# Patient Record
Sex: FEMALE | Race: WHITE
[De-identification: names, ages, dates, MRNs, and addresses within clinical notes are randomized per-mention and may not be internally consistent; named-entity substitution may affect disease eponyms.]

---

## 2018-09-24 ENCOUNTER — HOSPITAL ENCOUNTER (EMERGENCY)
Dept: HOSPITAL 97 - ER | Age: 48
Discharge: HOME | End: 2018-09-24
Payer: COMMERCIAL

## 2018-09-24 VITALS — OXYGEN SATURATION: 97 % | SYSTOLIC BLOOD PRESSURE: 140 MMHG | DIASTOLIC BLOOD PRESSURE: 76 MMHG

## 2018-09-24 VITALS — TEMPERATURE: 98.2 F

## 2018-09-24 DIAGNOSIS — R04.0: Primary | ICD-10-CM

## 2018-09-24 DIAGNOSIS — I10: ICD-10-CM

## 2018-09-24 DIAGNOSIS — F41.8: ICD-10-CM

## 2018-09-24 DIAGNOSIS — Z88.0: ICD-10-CM

## 2018-09-24 PROCEDURE — 99281 EMR DPT VST MAYX REQ PHY/QHP: CPT

## 2018-09-24 NOTE — EDPHYS
Physician Documentation                                                                           

 [facility]                                                                

Name: Isabel Resendiz                                                                               

Age: 48 yrs                                                                                       

Sex: Female                                                                                       

: 1970                                                                                   

MRN: [id number]                                                                                   

Arrival Date: 2018                                                                          

Time: 15:10                                                                                       

Account#: [id number]                                                                            

Bed 18                                                                                            

Private MD:                                                                                       

ED Physician Horace Solis                                                                      

HPI:                                                                                              

                                                                                             

17:07 This 48 yrs old  Female presents to ER via Ambulatory with complaints of Nose  kb  

      Bleed.                                                                                      

17:07 The patient presents with a nose bleed, and the bleeding resolved prior to arrival.     kb  

      Onset: The symptoms/episode began/occurred just prior to arrival. Modifying factors:        

      The symptoms are alleviated by pressure, the symptoms are aggravated by nothing.            

      Associated signs and symptoms: The patient has no apparent associated signs or              

      symptoms, Loss of consciousness: the patient experienced no loss of consciousness.          

      Severity of symptoms: At their worst the symptoms were moderate in the emergency            

      department the symptoms are unchanged. The patient has not experienced similar symptoms     

      in the past. The patient has not recently seen a physician.                                 

                                                                                                  

OB/GYN:                                                                                           

15:25 LMP 2018                                                                            aa5 

                                                                                                  

Historical:                                                                                       

- Allergies:                                                                                      

15:25 PENICILLINS;                                                                            aa5 

- Home Meds:                                                                                      

16:27 Dexilant 60 mg Oral CpDB 1 cap once daily [Active]; Propranolol Oral as needed          hj  

      [Active]; Zoloft 50 mg Oral tab 1 tab once daily [Active]; lisinopril 20 mg oral tab 1      

      tab twice a day [Active];                                                                   

- PMHx:                                                                                           

15:25 Anxiety; Depression; Diverticulitis; Hypertension; palpitations; SVT; Tachycardia;      aa5 

- PSHx:                                                                                           

15:25 ; Tonsillectomy;                                                               aa5 

                                                                                                  

- Immunization history:: Flu vaccine is up to date.                                               

- Social history:: Smoking status: Patient/guardian denies using tobacco.                         

- Ebola Screening: : No symptoms or risks identified at this time.                                

                                                                                                  

                                                                                                  

ROS:                                                                                              

17:06 Constitutional: Negative for fever, chills, and weight loss, Cardiovascular: Negative   kb  

      for chest pain, palpitations, and edema, Respiratory: Negative for shortness of breath,     

      cough, wheezing, and pleuritic chest pain, Abdomen/GI: Negative for abdominal pain,         

      nausea, vomiting, diarrhea, and constipation, MS/Extremity: Negative for injury and         

      deformity, Skin: Negative for injury, rash, and discoloration, Neuro: Negative for          

      headache, weakness, numbness, tingling, and seizure.                                        

17:06 ENT: Positive for nose bleed.                                                               

                                                                                                  

Exam:                                                                                             

17:06 Constitutional:  This is a well developed, well nourished patient who is awake, alert,  kb  

      and in no acute distress. Head/Face:  Normocephalic, atraumatic. Neck:  Trachea             

      midline, no thyromegaly or masses palpated, and no cervical lymphadenopathy.  Supple,       

      full range of motion without nuchal rigidity, or vertebral point tenderness.  No            

      Meningismus. Chest/axilla:  Normal chest wall appearance and motion.  Nontender with no     

      deformity.  No lesions are appreciated. Cardiovascular:  Regular rate and rhythm with a     

      normal S1 and S2.  No gallops, murmurs, or rubs.  Normal PMI, no JVD.  No pulse             

      deficits. Respiratory:  Lungs have equal breath sounds bilaterally, clear to                

      auscultation and percussion.  No rales, rhonchi or wheezes noted.  No increased work of     

      breathing, no retractions or nasal flaring. Abdomen/GI:  Soft, non-tender, with normal      

      bowel sounds.  No distension or tympany.  No guarding or rebound.  No evidence of           

      tenderness throughout. Skin:  Warm, dry with normal turgor.  Normal color with no           

      rashes, no lesions, and no evidence of cellulitis. MS/ Extremity:  Pulses equal, no         

      cyanosis.  Neurovascular intact.  Full, normal range of motion. Neuro:  Awake and           

      alert, GCS 15, oriented to person, place, time, and situation.  Cranial nerves II-XII       

      grossly intact.  Motor strength 5/5 in all extremities.  Sensory grossly intact.            

      Cerebellar exam normal.  Normal gait.                                                       

17:06 ENT: Nose: clotted blood, in both nares.                                                    

                                                                                                  

Vital Signs:                                                                                      

15:25  / 71; Pulse 94; Resp 18 S; Temp 98.2(TE); Pulse Ox 98% on R/A; Weight 106.14 kg  aa5 

      (R); Height 5 ft. 2 in. (157.48 cm) (R); Pain 0/10;                                         

17:06  / 76; Pulse 85; Resp 18; Pulse Ox 97% on R/A;                                    hj  

15:25 Body Mass Index 42.80 (106.14 kg, 157.48 cm)                                            aa5 

                                                                                                  

MDM:                                                                                              

16:40 Patient medically screened.                                                             kb  

17:07 Data reviewed: vital signs, nurses notes. Data interpreted: Pulse oximetry: on room air kb  

      is 97 %. Interpretation: normal. Counseling: I had a detailed discussion with the           

      patient and/or guardian regarding: the historical points, exam findings, and any            

      diagnostic results supporting the discharge/admit diagnosis, the need for outpatient        

      follow up, a family practitioner, to return to the emergency department if symptoms         

      worsen or persist or if there are any questions or concerns that arise at home.             

                                                                                                  

Administered Medications:                                                                         

No medications were administered                                                                  

                                                                                                  

                                                                                                  

Disposition:                                                                                      

                                                                                             

07:54 Co-signature as Attending Physician, Horace Solis MD I agree with the assessment and  neela 

      plan of care.                                                                               

                                                                                                  

Disposition:                                                                                      

18 17:08 Discharged to Home. Impression: Epistaxis.                                         

- Condition is Stable.                                                                            

- Discharge Instructions: Nosebleed, Easy-to-Read.                                                

                                                                                                  

- Medication Reconciliation Form, Thank You Letter, Antibiotic Education, Prescription            

  Opioid Use form.                                                                                

- Follow up: Emergency Department; When: As needed; Reason: Worsening of condition.               

  Follow up: Private Physician; When: 2 - 3 days; Reason: Recheck today's complaints,             

  Continuance of care, Re-evaluation by your physician.                                           

                                                                                                  

                                                                                                  

                                                                                                  

Signatures:                                                                                       

Wendy Lynne, SUNIL-C                 LEVP-Horace Horton MD MD cha Calderon, Audri, RN                     RN   aa5                                                  

Jace Zarate, RN                      RN   hj                                                   

                                                                                                  

Corrections: (The following items were deleted from the chart)                                    

                                                                                             

17:21 17:08 2018 17:08 Discharged to Home. Impression: Epistaxis. Condition is Stable.  hj  

      Forms are Medication Reconciliation Form, Thank You Letter, Antibiotic Education,           

      Prescription Opioid Use. Follow up: Emergency Department; When: As needed; Reason:          

      Worsening of condition. Follow up: Private Physician; When: 2 - 3 days; Reason: Recheck     

      today's complaints, Continuance of care, Re-evaluation by your physician. kb                

                                                                                                  

**************************************************************************************************

## 2018-09-24 NOTE — ER
Nurse's Notes                                                                                     

 [facility]                                                                

Name: Isabel Resendiz                                                                               

Age: 48 yrs                                                                                       

Sex: Female                                                                                       

: 1970                                                                                   

MRN: [id number]                                                                                   

Arrival Date: 2018                                                                          

Time: 15:10                                                                                       

Account#: [id number]                                                                            

Bed 18                                                                                            

Private MD:                                                                                       

Diagnosis: Epistaxis                                                                              

                                                                                                  

Presentation:                                                                                     

                                                                                             

15:23 Presenting complaint: Patient states: nose bleed that began just PTA. Pt states "I've   aa5 

      had the flu for the last week but it's pretty much gone now". No bleeding noted at this     

      time. Denies taking anticoagulants. Transition of care: patient was not received from       

      another setting of care. Onset of symptoms was 2018. Risk Assessment: Do      

      you want to hurt yourself or someone else? Patient reports no desire to harm self or        

      others. Initial Sepsis Screen: Does the patient meet any 2 criteria? No. Patient's          

      initial sepsis screen is negative. Does the patient have a suspected source of              

      infection? No. Patient's initial sepsis screen is negative. Care prior to arrival: None.    

15:23 Method Of Arrival: Ambulatory                                                           aa5 

15:23 Acuity: ARNIE 4                                                                           aa5 

                                                                                                  

Triage Assessment:                                                                                

16:24 General: Appears in no apparent distress. uncomfortable, Behavior is calm, cooperative, hj  

      appropriate for age. Pain: Denies pain.                                                     

                                                                                                  

OB/GYN:                                                                                           

15:25 LMP 2018                                                                            aa5 

                                                                                                  

Historical:                                                                                       

- Allergies:                                                                                      

15:25 PENICILLINS;                                                                            aa5 

- Home Meds:                                                                                      

16:27 Dexilant 60 mg Oral CpDB 1 cap once daily [Active]; Propranolol Oral as needed          hj  

      [Active]; Zoloft 50 mg Oral tab 1 tab once daily [Active]; lisinopril 20 mg oral tab 1      

      tab twice a day [Active];                                                                   

- PMHx:                                                                                           

15:25 Anxiety; Depression; Diverticulitis; Hypertension; palpitations; SVT; Tachycardia;      aa5 

- PSHx:                                                                                           

15:25 ; Tonsillectomy;                                                               aa5 

                                                                                                  

- Immunization history:: Flu vaccine is up to date.                                               

- Social history:: Smoking status: Patient/guardian denies using tobacco.                         

- Ebola Screening: : No symptoms or risks identified at this time.                                

                                                                                                  

                                                                                                  

Screenin:24 Abuse screen: Denies threats or abuse. Denies injuries from another. Nutritional        hj  

      screening: No deficits noted. Tuberculosis screening: No symptoms or risk factors           

      identified. Fall Risk None identified.                                                      

                                                                                                  

Assessment:                                                                                       

15:25 General: Appears in no apparent distress. uncomfortable, Behavior is calm, cooperative, hj  

      appropriate for age. Pain: Denies pain. Neuro: Level of Consciousness is awake, alert,      

      obeys commands, Oriented to person, place, time, situation, Appropriate for age.            

      Cardiovascular: Capillary refill < 3 seconds Patient's skin is warm and dry.                

      Respiratory: Reports nose bleed that stopped after 10 mins of pressure; Airway is           

      patent Respiratory effort is even, unlabored, Respiratory pattern is regular,               

      symmetrical, Parent/caregiver reports the patient having. GI:. : No signs and/or          

      symptoms were reported regarding the genitourinary system. EENT: No signs and/or            

      symptoms were reported regarding the EENT system. Derm: No signs and/or symptoms            

      reported regarding the dermatologic system. Musculoskeletal: No signs and/or symptoms       

      reported regarding the musculoskeletal system.                                              

16:30 Reassessment: Patient and/or family updated on plan of care and expected duration. Pain hj  

      level reassessed. Patient is alert, oriented x 3, equal unlabored respirations, skin        

      warm/dry/pink. took propanol; will re check BP;.                                            

17:09 Reassessment: Patient and/or family updated on plan of care and expected duration. Pain hj  

      level reassessed. Patient is alert, oriented x 3, equal unlabored respirations, skin        

      warm/dry/pink. BP re checked, no reports of bleeding;.                                      

                                                                                                  

Vital Signs:                                                                                      

15:25  / 71; Pulse 94; Resp 18 S; Temp 98.2(TE); Pulse Ox 98% on R/A; Weight 106.14 kg  aa5 

      (R); Height 5 ft. 2 in. (157.48 cm) (R); Pain 0/10;                                         

17:06  / 76; Pulse 85; Resp 18; Pulse Ox 97% on R/A;                                    hj  

15:25 Body Mass Index 42.80 (106.14 kg, 157.48 cm)                                            aa5 

                                                                                                  

ED Course:                                                                                        

15:10 Patient arrived in ED.                                                                  mr  

15:25 Triage completed.                                                                       aa5 

15:25 Arm band placed on.                                                                     aa5 

16:24 Jace Zarate, RN is Primary Nurse.                                                    hj  

16:24 Patient has correct armband on for positive identification. Bed in low position. Call   hj  

      light in reach. Side rails up X 1.                                                          

16:39 [facility]                                                        kb  

16:39 Horace Solis MD is Attending Physician.                                             kb  

17:20 No provider procedures requiring assistance completed. Patient did not have IV access   hj  

      during this emergency room visit.                                                           

                                                                                                  

Administered Medications:                                                                         

No medications were administered                                                                  

                                                                                                  

                                                                                                  

Outcome:                                                                                          

17:08 Discharge ordered by MD.                                                                kb  

17:21 Discharged to home ambulatory.                                                          hj  

17:21 Condition: stable                                                                           

17:21 Discharge instructions given to patient, Instructed on discharge instructions, follow       

      up and referral plans. Demonstrated understanding of instructions, follow-up care.          

17:21 Patient left the ED.                                                                    hj  

                                                                                                  

Signatures:                                                                                       

Wendy Lynne FNP-C FNP-Kim Durbin                                                                                   

Noni Bartlett, RN                     RN   aa5                                                  

Jace Zarate, KAREN                      RN                                                      

                                                                                                  

**************************************************************************************************

## 2018-12-07 ENCOUNTER — HOSPITAL ENCOUNTER (EMERGENCY)
Dept: HOSPITAL 97 - ER | Age: 48
Discharge: HOME | End: 2018-12-07
Payer: COMMERCIAL

## 2018-12-07 VITALS — DIASTOLIC BLOOD PRESSURE: 96 MMHG | TEMPERATURE: 97.6 F | OXYGEN SATURATION: 98 % | SYSTOLIC BLOOD PRESSURE: 137 MMHG

## 2018-12-07 DIAGNOSIS — F41.9: ICD-10-CM

## 2018-12-07 DIAGNOSIS — Z79.899: ICD-10-CM

## 2018-12-07 DIAGNOSIS — J06.9: Primary | ICD-10-CM

## 2018-12-07 DIAGNOSIS — F32.9: ICD-10-CM

## 2018-12-07 DIAGNOSIS — I10: ICD-10-CM

## 2018-12-07 PROCEDURE — 87070 CULTURE OTHR SPECIMN AEROBIC: CPT

## 2018-12-07 PROCEDURE — 87081 CULTURE SCREEN ONLY: CPT

## 2018-12-07 PROCEDURE — 87804 INFLUENZA ASSAY W/OPTIC: CPT

## 2018-12-07 PROCEDURE — 99283 EMERGENCY DEPT VISIT LOW MDM: CPT

## 2018-12-07 NOTE — ER
Nurse's Notes                                                                                     

 [facility]                                                                

Name: Isabel Resendiz                                                                               

Age: 48 yrs                                                                                       

Sex: Female                                                                                       

: 1970                                                                                   

MRN: [id number]                                                                                   

Arrival Date: 2018                                                                          

Time: 19:15                                                                                       

Account#: [id number]                                                                            

Bed 26                                                                                            

Private MD:                                                                                       

Diagnosis: Acute upper respiratory infection, unspecified                                         

                                                                                                  

Presentation:                                                                                     

                                                                                             

19:19 Presenting complaint: Patient states: "My grand-daughter has strep throat so I wanted   hb  

      to get checked out. My throat is red and itchy" Reports that her throat has been sore       

      for the past 3 days. Denies fever. Transition of care: patient was not received from        

      another setting of care. Resp Distress? No respiratory distress is noted at this time.      

      Onset of symptoms was 2018. Risk Assessment: Do you want to hurt yourself      

      or someone else? Patient reports no desire to harm self or others. Initial Sepsis           

      Screen: Does the patient meet any 2 criteria? No. Patient's initial sepsis screen is        

      negative. Does the patient have a suspected source of infection? No. Patient's initial      

      sepsis screen is negative. Care prior to arrival: None.                                     

19:19 Method Of Arrival: Ambulatory                                                             

19:19 Acuity: ARNIE 4                                                                           hb  

                                                                                                  

Triage Assessment:                                                                                

19:22 General: Appears in no apparent distress. comfortable, Behavior is calm, cooperative,   hb  

      appropriate for age. Pain: Complains of pain in left aspect of posterior pharynx and        

      right aspect of posterior pharynx Pain currently is 3 out of 10 on a pain scale. Neuro:     

      Level of Consciousness is awake, alert, obeys commands. Cardiovascular: Patient's skin      

      is warm and dry. Respiratory: Airway is patent Respiratory effort is even, unlabored,       

      Respiratory pattern is regular, symmetrical.                                                

19:31 EENT: Throat is clear is pink Reports nasal congestion nasal discharge. Respiratory:    ak1 

      Breath sounds are clear. GI: No signs and/or symptoms were reported involving the           

      gastrointestinal system. : No signs and/or symptoms were reported regarding the           

      genitourinary system. Derm: No signs and/or symptoms reported regarding the                 

      dermatologic system. Musculoskeletal: No signs and/or symptoms reported regarding the       

      musculoskeletal system.                                                                     

                                                                                                  

OB/GYN:                                                                                           

19:22 LMP 2018                                                                           hb  

                                                                                                  

Historical:                                                                                       

- Allergies:                                                                                      

19:22 PENICILLINS;                                                                            hb  

- Home Meds:                                                                                      

19:22 Dexilant 60 mg Oral CpDB 1 cap once daily [Active]; lisinopril 20 mg Oral tab 1 tab     hb  

      twice a day [Active]; lisinopril-hydrochlorothiazide 20-12.5 mg Oral tab 1 tab once         

      daily [Active]; Propranolol Oral as needed [Active]; Zoloft 50 mg Oral tab 1 tab once       

      daily [Active]; ProAir HFA inhalation inhalation [Active]; Zyrtec Oral [Active];            

- PMHx:                                                                                           

19:22 Anxiety; Depression; Diverticulitis; Hypertension; palpitations; SVT; Tachycardia;      hb  

                                                                                                  

- Immunization history:: Flu vaccine is not up to date.                                           

- Social history:: Smoking status: Patient/guardian denies using tobacco.                         

- Ebola Screening: : Patient denies travel to an Ebola-affected area in the 21 days               

  before illness onset.                                                                           

                                                                                                  

                                                                                                  

Screenin:30 Abuse screen: Denies threats or abuse. Denies injuries from another. Nutritional        ak1 

      screening: No deficits noted. Tuberculosis screening: No symptoms or risk factors           

      identified. Fall Risk None identified.                                                      

                                                                                                  

Assessment:                                                                                       

19:31 Reassessment: Patient appears in no apparent distress at this time. No changes from     ak1 

      previously documented assessment. see triage assessment.                                    

                                                                                                  

Vital Signs:                                                                                      

19:22  / 96; Pulse 87; Resp 18; Temp 97.6; Pulse Ox 98% on R/A; Weight 111.13 kg (R);   hb  

      Height 5 ft. 2 in. (157.48 cm) (R); Pain 3/10;                                              

19:22 Body Mass Index 44.81 (111.13 kg, 157.48 cm)                                            hb  

                                                                                                  

ED Course:                                                                                        

19:15 Patient arrived in ED.                                                                  mr  

19:21 Triage completed.                                                                       hb  

19:22 Arm band placed on Patient placed in an exam room.                                      hb  

19:26 Candelaria Dunne, RN is Primary Nurse.                                                     ak1 

19:31 Patient has correct armband on for positive identification. Bed in low position. Call   ak1 

      light in reach. Side rails up X 1.                                                          

19:34 Horace Cohen PA is PHCP.                                                                cp  

19:36 Brandan White MD is Attending Physician.                                             cp  

20:22 No provider procedures requiring assistance completed.                                  ak1 

21:00 Patient did not have IV access during this emergency room visit.                        ak1 

                                                                                                  

Administered Medications:                                                                         

20:01 Drug: Tessalon Perle 200 mg Route: PO;                                                  ak1 

20:22 Follow up: Response: No adverse reaction                                                ak1 

                                                                                                  

                                                                                                  

Outcome:                                                                                          

20:54 Condition: good                                                                         ak1 

20:56 Discharge ordered by MD.                                                                cp  

20:58 Discharged to home ambulatory.                                                          ak1 

20:58 Discharge instructions given to patient, Instructed on discharge instructions, follow       

      up and referral plans. no drinking with medication, no driving heavy equipment,             

      medication usage, Demonstrated understanding of instructions, follow-up care,               

      medications, Prescriptions given X 2.                                                       

21:01 Patient left the ED.                                                                    ak1 

                                                                                                  

Signatures:                                                                                       

Lalita Diaz                                 mr                                                   

Candelaria Dunne RN                       RN   ak1                                                  

Horace Cohen PA PA cp Baxter, Heather, KAREN                     RN                                                      

                                                                                                  

**************************************************************************************************

## 2018-12-07 NOTE — EDPHYS
Physician Documentation                                                                           

 [facility]                                                                

Name: Isabel Resendiz                                                                               

Age: 48 yrs                                                                                       

Sex: Female                                                                                       

: 1970                                                                                   

MRN: [id number]                                                                                   

Arrival Date: 2018                                                                          

Time: 19:15                                                                                       

Account#: [id number]                                                                            

Bed 26                                                                                            

Private MD:                                                                                       

ED Physician Brandan White                                                                      

HPI:                                                                                              

                                                                                             

19:45 This 48 yrs old  Female presents to ER via Ambulatory with complaints of       cp  

      Congestion, Sore Throat, Cough.                                                             

                                                                                                  

OB/GYN:                                                                                           

19:22 LMP 2018                                                                           hb  

                                                                                                  

Historical:                                                                                       

- Allergies:                                                                                      

19:22 PENICILLINS;                                                                            hb  

- Home Meds:                                                                                      

19:22 Dexilant 60 mg Oral CpDB 1 cap once daily [Active]; lisinopril 20 mg Oral tab 1 tab     hb  

      twice a day [Active]; lisinopril-hydrochlorothiazide 20-12.5 mg Oral tab 1 tab once         

      daily [Active]; Propranolol Oral as needed [Active]; Zoloft 50 mg Oral tab 1 tab once       

      daily [Active]; ProAir HFA inhalation inhalation [Active]; Zyrtec Oral [Active];            

- PMHx:                                                                                           

19:22 Anxiety; Depression; Diverticulitis; Hypertension; palpitations; SVT; Tachycardia;      hb  

                                                                                                  

- Immunization history:: Flu vaccine is not up to date.                                           

- Social history:: Smoking status: Patient/guardian denies using tobacco.                         

- Ebola Screening: : Patient denies travel to an Ebola-affected area in the 21 days               

  before illness onset.                                                                           

                                                                                                  

                                                                                                  

ROS:                                                                                              

20:00 Constitutional: Negative for body aches, chills, fever, poor PO intake.                 cp  

20:00 Eyes: Negative for injury, pain, redness, and discharge.                                cp  

20:00 ENT: Positive for sore throat, Negative for drainage from ear(s), ear pain, difficulty      

      swallowing, difficulty handling secretions.                                                 

20:00 Cardiovascular: Negative for chest pain.                                                    

20:00 Respiratory: Positive for cough, Negative for shortness of breath, wheezing.                

20:00 Abdomen/GI: Negative for abdominal pain, nausea, vomiting, and diarrhea.                    

20:00 Skin: Negative for cellulitis, rash.                                                        

20:00 Neuro: Negative for altered mental status, headache.                                        

20:00 All other systems are negative.                                                             

                                                                                                  

Exam:                                                                                             

20:05 Constitutional: The patient appears in no acute distress, alert, awake, non-toxic, well cp  

      developed, well nourished.                                                                  

20:05 Head/Face:  Normocephalic, atraumatic.                                                  cp  

20:05 Eyes: Periorbital structures: appear normal, Conjunctiva: normal, no exudate, no            

      injection, Lids and lashes: appear normal, bilaterally.                                     

20:05 ENT: External ear(s): are unremarkable, Ear canal(s): are normal, clear, TM's: bulging,     

      is not appreciated, bilaterally, dullness, bilaterally, erythema, is not appreciated,       

      bilaterally, Nose: is normal, Mouth: Lips: moist, Oral mucosa: pink and intact, moist,      

      Posterior pharynx: Airway: no evidence of obstruction, patent, Tonsils: no enlargement,     

      no exudate, swelling, is not appreciated, erythema, that is mild, exudate, is not           

      appreciated.                                                                                

20:05 Neck: ROM/movement: is normal, is supple, without pain, no range of motions                 

      limitations, no nuchal rigidity, Lymph nodes: lymphadenopathy is appreciated, anterior      

      cervical nodes.                                                                             

20:05 Chest/axilla: Inspection: normal, Palpation: is normal, no crepitus, no tenderness.         

20:05 Cardiovascular: Rate: normal, Rhythm: regular, Edema: is not appreciated, JVD: is not       

      appreciated.                                                                                

20:05 Respiratory: the patient does not display signs of respiratory distress,  Respirations:     

      normal, no use of accessory muscles, no retractions, no splinting, no tachypnea, Breath     

      sounds: are clear throughout, no decreased breath sounds, no stridor, no wheezing.          

20:05 Abdomen/GI: Exam negative for discomfort, distension, guarding, Inspection: abdomen         

      appears normal.                                                                             

20:05 Back: pain, is absent, ROM is normal.                                                       

20:05 Skin: cellulitis, is not appreciated, no rash present.                                      

                                                                                                  

Vital Signs:                                                                                      

19:22  / 96; Pulse 87; Resp 18; Temp 97.6; Pulse Ox 98% on R/A; Weight 111.13 kg (R);   hb  

      Height 5 ft. 2 in. (157.48 cm) (R); Pain 3/10;                                              

19:22 Body Mass Index 44.81 (111.13 kg, 157.48 cm)                                            hb  

                                                                                                  

MDM:                                                                                              

19:36 Patient medically screened.                                                             cp  

20:00 Differential diagnosis: URI, pneumonia meningitis, strep throat, influenza.             cp  

20:55 Data reviewed: vital signs, nurses notes, lab test result(s), and as a result, I will   cp  

      discharge patient.                                                                          

20:55 Counseling: I had a detailed discussion with the patient and/or guardian regarding: the cp  

      historical points, exam findings, and any diagnostic results supporting the                 

      discharge/admit diagnosis, lab results, to return to the emergency department if            

      symptoms worsen or persist or if there are any questions or concerns that arise at          

      home. Special discussion: I discussed with the patient/guardian that the patient's          

      current presentation does not indicate dosing of antibiotics. They should follow-up         

      with their primary care provider and return if the symptoms persist or progress.            

                                                                                                  

                                                                                             

19:27 Order name: Strep; Complete Time: 20:52                                                 ak1 

                                                                                             

20:52 Interpretation: Reviewed.                                                               cp  

                                                                                             

19:50 Order name: Influenza Screen (a \T\ B); Complete Time: 20:52                              cp

                                                                                             

20:52 Interpretation: Reviewed.                                                                 

                                                                                             

19:56 Order name: Throat Culture                                                              EDMS

                                                                                                  

Administered Medications:                                                                         

20:01 Drug: Tessalon Perle 200 mg Route: PO;                                                  ak1 

20:22 Follow up: Response: No adverse reaction                                                ak1 

                                                                                                  

                                                                                                  

Disposition:                                                                                      

18 20:56 Discharged to Home. Impression: Acute upper respiratory infection, unspecified.    

- Condition is Stable.                                                                            

- Discharge Instructions: Upper Respiratory Infection, Adult.                                     

- Prescriptions for Tessalon Perles 100 mg Oral Capsule - take 2 capsule by ORAL route            

  every 8 hours As needed; 30 capsule. Albuterol Sulfate 90 mcg/actuation - inhale 1-2            

  puff by INHALATION route every 4-6 hours; 1 Inhaler.                                            

- Medication Reconciliation Form, Thank You Letter, Antibiotic Education, Prescription            

  Opioid Use form.                                                                                

- Follow up: Private Physician; When: 2 - 3 days; Reason: symptoms continue.                      

- Problem is new.                                                                                 

- Symptoms have improved.                                                                         

                                                                                                  

                                                                                                  

                                                                                                  

Addendum:                                                                                         

12/10/2018                                                                                        

     20:56 Co-signature as Attending Physician, Brandan White MD Available for consultation at    p
s1

           all times.                                                                             

                                                                                                  

Signatures:                                                                                       

Dispatcher [employer]                           EDMS                                                 

Candelaria Dunne RN                       RN   ak1                                                  

Horace Cohen PA PA cp Baxter, Heather, RN RN hb Singer, Phillip, MD MD   ps1                                                  

                                                                                                  

Corrections: (The following items were deleted from the chart)                                    

                                                                                             

21:01 20:56 2018 20:56 Discharged to Home. Impression: Acute upper respiratory          ak1 

      infection, unspecified. Condition is Stable. Forms are Medication Reconciliation Form,      

      Thank You Letter, Antibiotic Education, Prescription Opioid Use. Follow up: Private         

      Physician; When: 2 - 3 days; Reason: symptoms continue. Problem is new. Symptoms have       

      improved. cp                                                                                

                                                                                                  

**************************************************************************************************

## 2019-02-13 ENCOUNTER — HOSPITAL ENCOUNTER (EMERGENCY)
Dept: HOSPITAL 97 - ER | Age: 49
Discharge: HOME | End: 2019-02-13
Payer: COMMERCIAL

## 2019-02-13 VITALS — TEMPERATURE: 98.2 F

## 2019-02-13 VITALS — DIASTOLIC BLOOD PRESSURE: 72 MMHG | SYSTOLIC BLOOD PRESSURE: 127 MMHG

## 2019-02-13 VITALS — OXYGEN SATURATION: 99 %

## 2019-02-13 DIAGNOSIS — E86.0: ICD-10-CM

## 2019-02-13 DIAGNOSIS — I10: Primary | ICD-10-CM

## 2019-02-13 DIAGNOSIS — R06.4: ICD-10-CM

## 2019-02-13 DIAGNOSIS — F41.9: ICD-10-CM

## 2019-02-13 DIAGNOSIS — Z79.899: ICD-10-CM

## 2019-02-13 DIAGNOSIS — F32.9: ICD-10-CM

## 2019-02-13 LAB
BUN BLD-MCNC: 14 MG/DL (ref 7–18)
GLUCOSE SERPLBLD-MCNC: 94 MG/DL (ref 74–106)
HCT VFR BLD CALC: 41.2 % (ref 36–45)
LYMPHOCYTES # SPEC AUTO: 4.1 K/UL (ref 0.7–4.9)
PMV BLD: 8.9 FL (ref 7.6–11.3)
POTASSIUM SERPL-SCNC: 4.5 MMOL/L (ref 3.5–5.1)
RBC # BLD: 4.73 M/UL (ref 3.86–4.86)
TROPONIN (EMERG DEPT USE ONLY): < 0.02 NG/ML (ref 0–0.04)

## 2019-02-13 PROCEDURE — 80048 BASIC METABOLIC PNL TOTAL CA: CPT

## 2019-02-13 PROCEDURE — 85025 COMPLETE CBC W/AUTO DIFF WBC: CPT

## 2019-02-13 PROCEDURE — 36415 COLL VENOUS BLD VENIPUNCTURE: CPT

## 2019-02-13 PROCEDURE — 99284 EMERGENCY DEPT VISIT MOD MDM: CPT

## 2019-02-13 PROCEDURE — 84484 ASSAY OF TROPONIN QUANT: CPT

## 2019-02-13 PROCEDURE — 93005 ELECTROCARDIOGRAM TRACING: CPT

## 2019-02-13 NOTE — EKG
Test Date:    2019-02-13               Test Time:    15:42:35

Technician:   TONY                                     

                                                     

MEASUREMENT RESULTS:                                       

Intervals:                                           

Rate:         66                                     

GA:           182                                    

QRSD:         88                                     

QT:           400                                    

QTc:          419                                    

Axis:                                                

P:            29                                     

GA:           182                                    

QRS:          12                                     

T:            25                                     

                                                     

INTERPRETIVE STATEMENTS:                                       

                                                     

Normal sinus rhythm

Minimal voltage criteria for LVH, may be normal variant

Borderline ECG

Compared to ECG 01/18/2017 23:23:45

No significant changes



Electronically Signed On 02-13-19 16:43:06 CST by Ramírez Morris

## 2019-02-13 NOTE — ER
Nurse's Notes                                                                                     

 [facility]                                                                

Name: Isabel Resendiz                                                                               

Age: 48 yrs                                                                                       

Sex: Female                                                                                       

: 1970                                                                                   

MRN: [id number]                                                                                   

Arrival Date: 2019                                                                          

Time: 14:06                                                                                       

Account#: [id number]                                                                            

Bed 8                                                                                             

Private MD: Unknown, Unknown                                                                      

Diagnosis: Hypertension, anxiety, hyperventilation, dehydration                                   

                                                                                                  

Presentation:                                                                                     

                                                                                             

14:08 Presenting complaint: Anxiety x 1 hr, Home /91, HR 93. Denies headache.           hb  

      Propanolol 10mg administered PTA. Transition of care: patient was not received from         

      another setting of care. Onset of symptoms was 2019. Risk Assessment: Do       

      you want to hurt yourself or someone else? Patient reports no desire to harm self or        

      others. Care prior to arrival: None.                                                        

14:08 Method Of Arrival: Ambulatory                                                           hb  

14:08 Acuity: ARNIE 3                                                                           hb  

15:45 Initial Sepsis Screen: Does the patient meet any 2 criteria? No. Patient's initial      sv  

      sepsis screen is negative. Does the patient have a suspected source of infection? No.       

      Patient's initial sepsis screen is negative.                                                

                                                                                                  

OB/GYN:                                                                                           

14:09 LMP 2019                                                                           hb  

                                                                                                  

Historical:                                                                                       

- Allergies:                                                                                      

14:10 PENICILLINS;                                                                            hb  

- Home Meds:                                                                                      

14:10 Dexilant 60 mg Oral CpDB 1 cap once daily [Active]; lisinopril 20 mg Oral tab 1 tab     hb  

      twice a day [Active]; lisinopril-hydrochlorothiazide 20-12.5 mg Oral tab 1 tab once         

      daily [Active]; ProAir HFA inhalation [Active]; Propranolol Oral as needed [Active];        

      Zoloft 50 mg Oral tab 1 tab once daily [Active]; Zyrtec Oral [Active];                      

- PMHx:                                                                                           

14:10 Anxiety; Depression; Diverticulitis; Hypertension; palpitations; SVT; Tachycardia;      hb  

                                                                                                  

- Immunization history:: Adult Immunizations up to date.                                          

- Social history:: Smoking status: Patient/guardian denies using tobacco.                         

- Ebola Screening: : No symptoms or risks identified at this time.                                

- Family history:: not pertinent.                                                                 

- Hospitalizations: : No recent hospitalization is reported.                                      

                                                                                                  

                                                                                                  

Screening:                                                                                        

15:45 Abuse screen: Denies threats or abuse. Denies injuries from another. Nutritional        sv  

      screening: No deficits noted. Tuberculosis screening: No symptoms or risk factors           

      identified. Fall Risk None identified.                                                      

                                                                                                  

Assessment:                                                                                       

15:45 General: Appears in no apparent distress. comfortable, obese, well developed, Behavior  sv  

      is calm, cooperative, appropriate for age. General: Reports "maybe feeling anxious at       

      times.". Pain: Denies pain. Neuro: Level of Consciousness is awake, alert, obeys            

      commands, Oriented to person, place, time, situation, Moves all extremities. Full           

      function Gait is steady. Respiratory: Respiratory effort is even, unlabored,                

      Respiratory pattern is regular, symmetrical. Derm: Skin is pink, warm \T\ dry.              

17:00 Reassessment: Patient appears in no apparent distress at this time. No changes from     sv  

      previously documented assessment. Patient and/or family updated on plan of care and         

      expected duration. Pain level reassessed. Patient is alert, oriented x 3, equal             

      unlabored respirations, skin warm/dry/pink.                                                 

19:29 Reassessment: Patient appears in no apparent distress at this time. No changes from     sv  

      previously documented assessment. Patient and/or family updated on plan of care and         

      expected duration. Pain level reassessed. Patient is alert, oriented x 3, equal             

      unlabored respirations, skin warm/dry/pink.                                                 

                                                                                                  

Vital Signs:                                                                                      

14:09  / 92; Pulse 82; Resp 18; Temp 98.2; Pulse Ox 100% on R/A; Pain 0/10;             hb  

16:05  / 72; Pulse 64; Resp 18; Pulse Ox 100% ;                                         sv  

16:30  / 79; Pulse 68; Resp 18; Pulse Ox 99% ;                                          sv  

17:00  / 63; Pulse 65; Resp 18; Pulse Ox 100% ;                                         sv  

17:30  / 56; Pulse 68; Resp 18; Pulse Ox 100% ;                                         sv  

18:00  / 74; Pulse 70; Resp 18; Pulse Ox 99% ;                                          sv  

19:15  / 72; Pulse 71; Resp 18; Pulse Ox 99% ;                                          sv  

                                                                                                  

ED Course:                                                                                        

14:06 Patient arrived in ED.                                                                  sb2 

14:07 Unknown, Unknown is Private Physician.                                                  sb2 

14:09 Triage completed.                                                                       hb  

14:09 Arm band placed on.                                                                     hb  

15:10 Germain Neumann MD is Attending Physician.                                                rn  

15:32 Belkys Bowers RN is Primary Nurse.                                                  sv  

15:45 Patient has correct armband on for positive identification. Placed in gown. Bed in low  sv  

      position. Call light in reach. Pulse ox on. NIBP on. Door closed. Head of bed elevated.     

15:45 Missed attempt(s): 22 gauge in left forearm. Bleeding controlled, band aid applied,     sv  

      catheter tip intact.                                                                        

15:46 EKG done, by EKG tech. reviewed by Germain Neumann MD.                                      dt2 

15:48 Initial lab(s) drawn, by me, sent to lab. Inserted saline lock: 22 gauge in left        sv  

      antecubital area, using aseptic technique. Blood collected. Flushed left antecubital        

      with 5 ml normal saline.                                                                    

19:29 No provider procedures requiring assistance completed. IV discontinued, intact,         sv  

      bleeding controlled, No redness/swelling at site. Pressure dressing applied.                

                                                                                                  

Administered Medications:                                                                         

No medications were administered                                                                  

                                                                                                  

                                                                                                  

Outcome:                                                                                          

18:20 Discharge ordered by MD.                                                                rn  

19:29 Discharged to home ambulatory.                                                          sv  

19:29 Condition: stable                                                                           

19:29 Discharge instructions given to patient, Instructed on discharge instructions, follow       

      up and referral plans. Demonstrated understanding of instructions, follow-up care.          

19:29 Patient left the ED.                                                                    sv  

                                                                                                  

Signatures:                                                                                       

Belkys Bowers, RN                    Germain Quintanilla MD MD rn Baxter, Heather, Jackie Wheatley RN                               sb2                                                  

Kellen Mtz                             dt2                                                  

                                                                                                  

**************************************************************************************************

## 2019-02-13 NOTE — EDPHYS
Physician Documentation                                                                           

 [facility]                                                                

Name: Isabel Resendiz                                                                               

Age: 48 yrs                                                                                       

Sex: Female                                                                                       

: 1970                                                                                   

MRN: [id number]                                                                                   

Arrival Date: 2019                                                                          

Time: 14:06                                                                                       

Account#: [id number]                                                                            

Bed 8                                                                                             

Private MD: Unknown, Unknown                                                                      

ED Physician Germain Neumann                                                                         

HPI:                                                                                              

                                                                                             

16:18 This 48 yrs old  Female presents to ER via Ambulatory with complaints of High  rn  

      Blood Pressure.                                                                             

16:18 The patient has elevated blood pressure and discovered this at home. Onset: The         rn  

      symptoms/episode began/occurred at an unknown time. Modifying factors:. Severity of         

      symptoms: At its worst the blood pressure was mild, in the emergency department the         

      blood pressure is improved. The patient has experienced similar episodes in the past.       

      Reports several intermittent episodes of feeling "spacy" for a few days, noticed BP was     

      high, also has frequent anxiety, took propranolol and felt better, no chest                 

      pain/sob/focal neuro deficits. .                                                            

                                                                                                  

OB/GYN:                                                                                           

14:09 LMP 2019                                                                           hb  

                                                                                                  

Historical:                                                                                       

- Allergies:                                                                                      

14:10 PENICILLINS;                                                                            hb  

- Home Meds:                                                                                      

14:10 Dexilant 60 mg Oral CpDB 1 cap once daily [Active]; lisinopril 20 mg Oral tab 1 tab     hb  

      twice a day [Active]; lisinopril-hydrochlorothiazide 20-12.5 mg Oral tab 1 tab once         

      daily [Active]; ProAir HFA inhalation [Active]; Propranolol Oral as needed [Active];        

      Zoloft 50 mg Oral tab 1 tab once daily [Active]; Zyrtec Oral [Active];                      

- PMHx:                                                                                           

14:10 Anxiety; Depression; Diverticulitis; Hypertension; palpitations; SVT; Tachycardia;      hb  

                                                                                                  

- Immunization history:: Adult Immunizations up to date.                                          

- Social history:: Smoking status: Patient/guardian denies using tobacco.                         

- Ebola Screening: : No symptoms or risks identified at this time.                                

- Family history:: not pertinent.                                                                 

- Hospitalizations: : No recent hospitalization is reported.                                      

                                                                                                  

                                                                                                  

ROS:                                                                                              

16:18 Constitutional: Negative for fever, chills, and weight loss, Eyes: Negative for injury, rn  

      pain, redness, and discharge, Neck: Negative for injury, pain, and swelling,                

      Cardiovascular: Negative for chest pain, and edema, Respiratory: Negative for shortness     

      of breath, cough, wheezing, and pleuritic chest pain, Abdomen/GI: Negative for              

      abdominal pain, nausea, vomiting, diarrhea, and constipation, MS/Extremity: Negative        

      for injury and deformity, Skin: Negative for injury, rash, and discoloration, Neuro:        

      Negative for headache, weakness, numbness, tingling, and seizure.                           

                                                                                                  

Exam:                                                                                             

16:18 Constitutional:  This is a well developed, well nourished patient who is awake, alert,  rn  

      and in no acute distress. Head/Face:  Normocephalic, atraumatic. Eyes:  Pupils equal        

      round and reactive to light, extra-ocular motions intact.  Lids and lashes normal.          

      Conjunctiva and sclera are non-icteric and not injected.  Cornea within normal limits.      

      Periorbital areas with no swelling, redness, or edema. ENT:  MMM Cardiovascular:            

      Regular rate and rhythm, No pulse deficits. Respiratory:  Lungs have equal breath           

      sounds bilaterally, clear to auscultation.  No increased work of breathing, no              

      retractions or nasal flaring. Abdomen/GI:  soft, non-tender Skin:  Warm, dry MS/            

      Extremity:  Pulses equal, no cyanosis.  Neurovascular intact.  Full, normal range of        

      motion.  Equal circumference. Neuro:  Awake and alert, GCS 15, oriented to person,          

      place, time, and situation.  Cranial nerves II-XII grossly intact.  Motor strength 5/5      

      in all extremities.  Sensory grossly intact.                                                

                                                                                                  

Vital Signs:                                                                                      

14:09  / 92; Pulse 82; Resp 18; Temp 98.2; Pulse Ox 100% on R/A; Pain 0/10;             hb  

16:05  / 72; Pulse 64; Resp 18; Pulse Ox 100% ;                                         sv  

16:30  / 79; Pulse 68; Resp 18; Pulse Ox 99% ;                                          sv  

17:00  / 63; Pulse 65; Resp 18; Pulse Ox 100% ;                                         sv  

17:30  / 56; Pulse 68; Resp 18; Pulse Ox 100% ;                                         sv  

18:00  / 74; Pulse 70; Resp 18; Pulse Ox 99% ;                                          sv  

19:15  / 72; Pulse 71; Resp 18; Pulse Ox 99% ;                                          sv  

                                                                                                  

MDM:                                                                                              

15:10 Patient medically screened.                                                             rn  

18:18 Differential diagnosis: hypertensive crisis, Malignant HTN. Differential diagnosis:     rn  

      anxiety. Data reviewed: vital signs, nurses notes. Counseling: I had a detailed             

      discussion with the patient and/or guardian regarding: the historical points, exam          

      findings, and any diagnostic results supporting the discharge/admit diagnosis, lab          

      results, the need for outpatient follow up, to return to the emergency department if        

      symptoms worsen or persist or if there are any questions or concerns that arise at          

      home. Response to treatment: the patient's condition has returned to base line, the         

      patient is now symptom free, and as a result, I will discharge patient. Special             

      discussion: I discussed with the patient/guardian in detail that at this point there is     

      no indication for admission to the hospital. It is understood, however, that if the         

      symptoms persist or worsen the patient needs to return immediately for re-evaluation.       

18:18 Counseling: I had a detailed discussion with the patient and/or guardian regarding: the rn  

      presence of at least one elevated blood pressure reading (>120/80) during this              

      emergency department visit.                                                                 

18:18 ED course: Will take BP journal and take to PCP for further BP management, normal neuro rn  

      exam and w/u. .                                                                             

                                                                                                  

                                                                                             

15:31 Order name: CBC with Diff                                                               rn  

                                                                                             

15:31 Order name: Basic Metabolic Panel; Complete Time: 19:03                                 rn  

                                                                                             

15:31 Order name: IV Start; Complete Time: 16:04                                              rn  

                                                                                             

15:31 Order name: Troponin (emerg Dept Use Only); Complete Time: 19:03                        rn  

                                                                                             

15:31 Order name: EKG; Complete Time: 15:32                                                   rn  

                                                                                             

15:31 Order name: EKG - Nurse/Tech; Complete Time: 16:04                                      rn  

                                                                                                  

Administered Medications:                                                                         

No medications were administered                                                                  

                                                                                                  

                                                                                                  

Disposition:                                                                                      

19 18:20 Discharged to Home. Impression: Hypertension, anxiety, hyperventilation,           

  dehydration.                                                                                    

- Condition is Stable.                                                                            

- Discharge Instructions: Hypertension, Generalized Anxiety Disorder.                             

                                                                                                  

- Medication Reconciliation Form, Thank You Letter, Antibiotic Education, Prescription            

  Opioid Use form.                                                                                

- Follow up: Private Physician; When: As needed; Reason: Recheck today's complaints,              

  Re-evaluation by your physician.                                                                

- Problem is new.                                                                                 

- Symptoms have improved.                                                                         

                                                                                                  

                                                                                                  

                                                                                                  

Signatures:                                                                                       

Dispatcher [employer]                           Belkys Mancini RN RN sv Nieto, Roman, MD MD rn Baxter, Heather, RN RN                                                      

                                                                                                  

Corrections: (The following items were deleted from the chart)                                    

19:29 18:20 2019 18:20 Discharged to Home. Impression: Hypertension, anxiety,           sv  

      hyperventilation, dehydration. Condition is Stable. Forms are Medication Reconciliation     

      Form, Thank You Letter, Antibiotic Education, Prescription Opioid Use. Follow up:           

      Private Physician; When: As needed; Reason: Recheck today's complaints, Re-evaluation       

      by your physician. Problem is new. Symptoms have improved. rn                               

                                                                                                  

**************************************************************************************************

## 2019-07-05 ENCOUNTER — HOSPITAL ENCOUNTER (EMERGENCY)
Dept: HOSPITAL 97 - ER | Age: 49
Discharge: HOME | End: 2019-07-05
Payer: COMMERCIAL

## 2019-07-05 VITALS — SYSTOLIC BLOOD PRESSURE: 128 MMHG | DIASTOLIC BLOOD PRESSURE: 78 MMHG | OXYGEN SATURATION: 100 % | TEMPERATURE: 98 F

## 2019-07-05 DIAGNOSIS — F32.9: ICD-10-CM

## 2019-07-05 DIAGNOSIS — J06.0: Primary | ICD-10-CM

## 2019-07-05 DIAGNOSIS — F41.9: ICD-10-CM

## 2019-07-05 DIAGNOSIS — I10: ICD-10-CM

## 2019-07-05 DIAGNOSIS — Z88.0: ICD-10-CM

## 2019-07-05 PROCEDURE — 99282 EMERGENCY DEPT VISIT SF MDM: CPT

## 2019-07-05 PROCEDURE — 87081 CULTURE SCREEN ONLY: CPT

## 2019-07-05 PROCEDURE — 87070 CULTURE OTHR SPECIMN AEROBIC: CPT

## 2019-07-05 NOTE — ER
Nurse's Notes                                                                                     

 [facility]                                                                 

Name: Isabel Resendiz                                                                               

Age: 48 yrs                                                                                       

Sex: Female                                                                                       

: 1970                                                                                   

MRN: [id number]                                                                                   

Arrival Date: 2019                                                                          

Time: 17:31                                                                                       

Account#: [id number]                                                                            

Bed 12                                                                                            

Private MD:                                                                                       

Diagnosis: Acute laryngopharyngitis                                                               

                                                                                                  

Presentation:                                                                                     

                                                                                             

17:54 Presenting complaint: Nonproductive cough and hoarse voice x 2-3 days. Denies           hb  

      pain/fever. Transition of care: patient was not received from another setting of care.      

      Onset of symptoms was 2019. Risk Assessment: Do you want to hurt yourself or       

      someone else? Patient reports no desire to harm self or others. Care prior to arrival:      

      None.                                                                                       

17:54 Method Of Arrival: Ambulatory                                                           hb  

17:54 Acuity: ARNIE 4                                                                           hb  

19:07 Initial Sepsis Screen: Does the patient meet any 2 criteria? No. Patient's initial      iw  

      sepsis screen is negative. Does the patient have a suspected source of infection? No.       

      Patient's initial sepsis screen is negative.                                                

                                                                                                  

OB/GYN:                                                                                           

17:55 LMP 2019                                                                            hb  

                                                                                                  

Historical:                                                                                       

- Allergies:                                                                                      

17:57 PENICILLINS;                                                                            hb  

- Home Meds:                                                                                      

17:57 Dexilant 60 mg Oral CpDB 1 cap once daily [Active]; lisinopril 20 mg Oral tab 1 tab     hb  

      twice a day [Active]; lisinopril-hydrochlorothiazide 20-12.5 mg Oral tab 1 tab once         

      daily [Active]; ProAir HFA inhalation [Active]; Propranolol Oral as needed [Active];        

      Zoloft 50 mg Oral tab 1 tab once daily [Active]; Zyrtec Oral [Active];                      

- PMHx:                                                                                           

17:57 Anxiety; Depression; Diverticulitis; Hypertension; palpitations; SVT; Tachycardia;      hb  

                                                                                                  

- Immunization history:: Adult Immunizations up to date.                                          

- Social history:: Smoking status: Patient/guardian denies using tobacco.                         

- Ebola Screening: : No symptoms or risks identified at this time.                                

                                                                                                  

                                                                                                  

Screenin:06 Abuse screen: Denies threats or abuse. Denies injuries from another. Nutritional        iw  

      screening: No deficits noted. Tuberculosis screening: No symptoms or risk factors           

      identified. Fall Risk None identified.                                                      

                                                                                                  

Assessment:                                                                                       

19:05 General: Appears in no apparent distress. Behavior is calm, cooperative. Pain:          iw  

      Complains of pain in throat. Neuro: Level of Consciousness is awake, alert, obeys           

      commands, Moves all extremities. Cardiovascular: Patient's skin is warm and dry.            

      Respiratory: Airway is patent Respiratory effort is even, unlabored, Breath sounds are      

      clear bilaterally. GI: No signs and/or symptoms were reported involving the                 

      gastrointestinal system. EENT: Throat is reddened bilaterally Reports difficulty            

      swallowing. Derm: Skin is intact, is healthy with good turgor. Musculoskeletal: Range       

      of motion: intact in all extremities.                                                       

                                                                                                  

Vital Signs:                                                                                      

17:55  / 78; Pulse 78; Resp 16; Temp 98; Pulse Ox 100% on R/A; Weight 111.13 kg; Height hb  

      5 ft. 2 in. (157.48 cm); Pain 2/10;                                                         

17:55 Body Mass Index 44.81 (111.13 kg, 157.48 cm)                                            hb  

                                                                                                  

ED Course:                                                                                        

17:31 Patient arrived in ED.                                                                  mr  

17:55 Triage completed.                                                                       hb  

17:57 Arm band placed on.                                                                     hb  

17:58 Mechelle Osborn, RN is Primary Nurse.                                                   hb  

17:58 Horace Cohen PA is PHCP.                                                                cp  

17:58 Horace Solis MD is Attending Physician.                                             cp  

18:30 Patient has correct armband on for positive identification.                             iw  

18:44 Belkys Benson MD is Referral Physician.                                           cp  

19:06 No provider procedures requiring assistance completed. Patient did not have IV access   iw  

      during this emergency room visit.                                                           

                                                                                                  

Administered Medications:                                                                         

No medications were administered                                                                  

                                                                                                  

                                                                                                  

Outcome:                                                                                          

18:51 Discharge ordered by MD.                                                                cp  

19:06 Discharged to home ambulatory.                                                          iw  

19:06 Condition: good                                                                             

19:06 Discharge instructions given to patient, Instructed on discharge instructions, follow       

      up and referral plans. medication usage, Demonstrated understanding of instructions,        

      follow-up care, medications, Prescriptions given X 1.                                       

19:07 Patient left the ED.                                                                    iw  

                                                                                                  

Signatures:                                                                                       

Lalita Diaz Irene RN                     KAREN                                                      

Horace Cohen PA PA cp Baxter, Heather, RN RN                                                      

                                                                                                  

**************************************************************************************************

## 2019-07-05 NOTE — EDPHYS
Physician Documentation                                                                           

 [facility]                                                                 

Name: Isabel Resendiz                                                                               

Age: 48 yrs                                                                                       

Sex: Female                                                                                       

: 1970                                                                                   

MRN: [id number]                                                                                   

Arrival Date: 2019                                                                          

Time: 17:31                                                                                       

Account#: [id number]                                                                            

Bed 12                                                                                            

Private MD:                                                                                       

ED Physician Horace Solis                                                                      

HPI:                                                                                              

                                                                                             

18:07 This 48 yrs old  Female presents to ER via Ambulatory with complaints of Sore  cp  

      Throat, Lost voice.                                                                         

18:07 The patient presents with sore throat. The patient describes throat pain as scratchy.   cp  

      Onset: The symptoms/episode began/occurred 2-3 weeks ago. Associated signs and              

      symptoms: Pertinent positives: loss of voice, intermittent cough, Pertinent negatives       

      dysphagia, fever.                                                                           

18:07 The patient has been recently seen at an urgent care, a couple of weeks ago, for        cp  

      similar complaints, given steroid injection and Flonase NS.                                 

                                                                                                  

OB/GYN:                                                                                           

17:55 LMP 2019                                                                            hb  

                                                                                                  

Historical:                                                                                       

- Allergies:                                                                                      

17:57 PENICILLINS;                                                                            hb  

- Home Meds:                                                                                      

17:57 Dexilant 60 mg Oral CpDB 1 cap once daily [Active]; lisinopril 20 mg Oral tab 1 tab     hb  

      twice a day [Active]; lisinopril-hydrochlorothiazide 20-12.5 mg Oral tab 1 tab once         

      daily [Active]; ProAir HFA inhalation [Active]; Propranolol Oral as needed [Active];        

      Zoloft 50 mg Oral tab 1 tab once daily [Active]; Zyrtec Oral [Active];                      

- PMHx:                                                                                           

17:57 Anxiety; Depression; Diverticulitis; Hypertension; palpitations; SVT; Tachycardia;      hb  

                                                                                                  

- Immunization history:: Adult Immunizations up to date.                                          

- Social history:: Smoking status: Patient/guardian denies using tobacco.                         

- Ebola Screening: : No symptoms or risks identified at this time.                                

                                                                                                  

                                                                                                  

ROS:                                                                                              

18:15 Constitutional: Negative for body aches, chills, fever, poor PO intake.                 cp  

18:15 Eyes: Negative for injury, pain, redness, and discharge.                                cp  

18:15 ENT: Positive for sore throat, Negative for drainage from ear(s), ear pain, difficulty      

      swallowing, difficulty handling secretions.                                                 

18:15 Neck: Negative for pain with movement, pain at rest, stiffness.                             

18:15 Respiratory: Negative for cough, wheezing.                                                  

18:15 Abdomen/GI: Negative for abdominal pain, nausea, vomiting, and diarrhea.                    

18:15 Skin: Negative for cellulitis, rash.                                                        

18:15 Neuro: Negative for altered mental status, headache, weakness.                              

18:15 All other systems are negative.                                                             

                                                                                                  

Exam:                                                                                             

18:20 Constitutional: The patient appears in no acute distress, alert, awake, non-toxic, well cp  

      developed, well nourished.                                                                  

18:20 Head/Face:  Normocephalic, atraumatic.                                                  cp  

18:20 Eyes: Periorbital structures: appear normal, Conjunctiva: normal, no exudate, no            

      injection, Sclera: no appreciated abnormality, Lids and lashes: appear normal,              

      bilaterally.                                                                                

18:20 ENT: External ear(s): are unremarkable, Ear canal(s): are normal, clear, TM's:              

      dullness, bilaterally, Nose: is normal, Mouth: Lips: moist, Oral mucosa: moist,             

      Posterior pharynx: Airway: no evidence of obstruction, patent, Tonsils: no enlargement,     

      no exudate, swelling, is not appreciated, erythema, that is mild, exudate, is not           

      appreciated.                                                                                

18:20 Neck: ROM/movement: is normal, is supple, without pain, no range of motions                 

      limitations, no meningismus, no nuchal rigidity, Lymph nodes: no appreciated                

      lymphadenopathy.                                                                            

18:20 Chest/axilla: Inspection: normal, Palpation: is normal, no crepitus, no tenderness.         

18:20 Cardiovascular: Rate: normal, Rhythm: regular.                                              

18:20 Respiratory: the patient does not display signs of respiratory distress,  Respirations:     

      normal, no use of accessory muscles, no retractions, no splinting, no tachypnea,            

      labored breathing, is not present, Breath sounds: are clear throughout, no decreased        

      breath sounds, no stridor, no wheezing.                                                     

18:20 Skin: no rash present.                                                                      

                                                                                                  

Vital Signs:                                                                                      

17:55  / 78; Pulse 78; Resp 16; Temp 98; Pulse Ox 100% on R/A; Weight 111.13 kg; Height hb  

      5 ft. 2 in. (157.48 cm); Pain 2/10;                                                         

17:55 Body Mass Index 44.81 (111.13 kg, 157.48 cm)                                            hb  

                                                                                                  

MDM:                                                                                              

17:58 Patient medically screened.                                                             cp  

18:30 Differential diagnosis: epiglottitis, gingivostomatitis, group A strep tonsillitis,     cp  

      avi's angina, mononucleosis, peritonsillar abscess retropharyngeal abcess.               

18:50 Data reviewed: vital signs, nurses notes, lab test result(s), and as a result, I will   cp  

      discharge patient.                                                                          

18:50 Counseling: I had a detailed discussion with the patient and/or guardian regarding: the cp  

      historical points, exam findings, and any diagnostic results supporting the                 

      discharge/admit diagnosis, lab results, to return to the emergency department if            

      symptoms worsen or persist or if there are any questions or concerns that arise at home.    

                                                                                                  

                                                                                             

18:07 Order name: Strep; Complete Time: 18:41                                                   

                                                                                             

18:41 Interpretation: Reviewed.                                                                 

                                                                                             

18:42 Order name: Throat Culture                                                              EDMS

                                                                                                  

Administered Medications:                                                                         

No medications were administered                                                                  

                                                                                                  

                                                                                                  

Disposition:                                                                                      

19 18:51 Discharged to Home. Impression: Acute laryngopharyngitis.                          

- Condition is Stable.                                                                            

- Discharge Instructions: Laryngitis, Pharyngitis.                                                

- Prescriptions for Zithromax Z- Dany 250 mg Oral Tablet - take 1 tablet by ORAL route             

  as directed for 5 days Day 1 - take two (2) tablets one time. Day 2, 3, 4 , 5 take              

  one (1) tablet once daily.; 6 tablet.                                                           

- Medication Reconciliation Form, Thank You Letter, Antibiotic Education, Prescription            

  Opioid Use form.                                                                                

- Follow up: Belkys Benson MD; When: 1 week; Reason: symptoms continue.                     

- Problem is new.                                                                                 

- Symptoms have improved.                                                                         

                                                                                                  

                                                                                                  

                                                                                                  

Addendum:                                                                                         

07/10/2019                                                                                        

     16:39 Co-signature as Attending Physician, Horace Solis MD I agree with the assessment and  c
ha

           plan of care.                                                                          

                                                                                                  

Signatures:                                                                                       

Dispatcher [employer]                           EDMS                                                 

Horace Solis MD MD cha Williams, Irene, RN                     RN   iw                                                   

Horace Cohen PA PA cp Baxter, Heather, KAREN                     RN                                                      

                                                                                                  

Corrections: (The following items were deleted from the chart)                                    

                                                                                             

19:07 18:51 2019 18:51 Discharged to Home. Impression: Acute laryngopharyngitis.        iw  

      Condition is Stable. Forms are Medication Reconciliation Form, Thank You Letter,            

      Antibiotic Education, Prescription Opioid Use. Follow up: Belkys Benson; When: 1        

      week; Reason: symptoms continue. Problem is new. Symptoms have improved. cp                 

                                                                                                  

**************************************************************************************************

## 2019-08-17 ENCOUNTER — HOSPITAL ENCOUNTER (EMERGENCY)
Dept: HOSPITAL 97 - ER | Age: 49
Discharge: HOME | End: 2019-08-17
Payer: COMMERCIAL

## 2019-08-17 VITALS — SYSTOLIC BLOOD PRESSURE: 126 MMHG | DIASTOLIC BLOOD PRESSURE: 83 MMHG

## 2019-08-17 VITALS — TEMPERATURE: 98.4 F | OXYGEN SATURATION: 99 %

## 2019-08-17 DIAGNOSIS — F41.8: ICD-10-CM

## 2019-08-17 DIAGNOSIS — J40: Primary | ICD-10-CM

## 2019-08-17 DIAGNOSIS — I10: ICD-10-CM

## 2019-08-17 DIAGNOSIS — Z88.0: ICD-10-CM

## 2019-08-17 PROCEDURE — 71046 X-RAY EXAM CHEST 2 VIEWS: CPT

## 2019-08-17 PROCEDURE — 99284 EMERGENCY DEPT VISIT MOD MDM: CPT

## 2019-08-17 NOTE — RAD REPORT
EXAM DESCRIPTION:  RAD - Chest Pa And Lat (2 Views) - 8/17/2019 2:44 am

 

CLINICAL HISTORY:  Cough;SOB

Chest pain.

 

COMPARISON:  Chest Single View dated 1/19/2017; Chest Single View dated 1/13/2017

 

FINDINGS:  The lungs are clear. The heart is normal in size. No displaced fractures.

 

IMPRESSION:  No acute or concerning finding suspected.

## 2019-08-17 NOTE — EDPHYS
Physician Documentation                                                                           

 [facility]                                                                 

Name: Isabel Resendiz                                                                               

Age: 49 yrs                                                                                       

Sex: Female                                                                                       

: 1970                                                                                   

MRN: [id number]                                                                                   

Arrival Date: 2019                                                                          

Time: :17                                                                                       

Account#: [id number]                                                                            

Bed 5                                                                                             

Private MD:                                                                                       

ED Physician Samuel Bentley                                                                     

HPI:                                                                                              

                                                                                             

03:15 This 49 yrs old  Female presents to ER via Ambulatory with complaints of       tw4 

      Breathing Difficulty.                                                                       

03:15 The patient has shortness of breath at rest. Onset: The symptoms/episode began/occurred tw4 

      today. Duration: The symptoms are continuous. The patient's shortness of breath has no      

      apparent modifying factors. Associated signs and symptoms: The patient has no apparent      

      associated signs or symptoms. Severity of symptoms: At their worst the symptoms were        

      mild in the emergency department the symptoms are unchanged. The patient has not            

      experienced similar symptoms in the past.                                                   

                                                                                                  

Historical:                                                                                       

- Allergies:                                                                                      

01:39 PENICILLINS;                                                                            aa1 

- Home Meds:                                                                                      

01:39 Dexilant 60 mg Oral CpDB 1 cap once daily [Active]; ProAir HFA inhalation [Active];     aa1 

      Propranolol Oral as needed [Active]; Zoloft 50 mg Oral tab 1 tab once daily [Active];       

      Zyrtec Oral [Active];                                                                       

- PMHx:                                                                                           

01:39 Anxiety; Depression; Diverticulitis; Hypertension; palpitations; SVT; Tachycardia;      aa1 

      Sleep Apnea;                                                                                

- PSHx:                                                                                           

01:39 ; Tonsillectomy;                                                               aa1 

                                                                                                  

- Immunization history:: Flu vaccine is not up to date.                                           

- Social history:: Smoking status: Patient/guardian denies using tobacco.                         

- Ebola Screening: : Patient denies exposure to infectious person Patient denies travel           

  to an Ebola-affected area in the 21 days before illness onset.                                  

                                                                                                  

                                                                                                  

ROS:                                                                                              

03:15 Constitutional: Negative for fever, chills, and weight loss, Eyes: Negative for injury, tw4 

      pain, redness, and discharge, Cardiovascular: Negative for chest pain, palpitations,        

      and edema, Abdomen/GI: Negative for abdominal pain, nausea, vomiting, diarrhea, and         

      constipation, Back: Negative for injury and pain.                                           

03:15 MS/Extremity: Negative for injury and deformity, Skin: Negative for injury, rash, and       

      discoloration, Neuro: Negative for headache, weakness, numbness, tingling, and seizure.     

03:15 Respiratory: Positive for cough, shortness of breath.                                       

                                                                                                  

Exam:                                                                                             

03:15 Constitutional:  This is a well developed, well nourished patient who is awake, alert,  tw4 

      and in no acute distress. Head/Face:  Normocephalic, atraumatic. Chest/axilla:  Normal      

      chest wall appearance and motion.  Nontender with no deformity.  No lesions are             

      appreciated. Cardiovascular:  Regular rate and rhythm with a normal S1 and S2.  No          

      gallops, murmurs, or rubs.  Normal PMI, no JVD.  No pulse deficits. Respiratory:  Lungs     

      have equal breath sounds bilaterally, clear to auscultation and percussion.  No rales,      

      rhonchi or wheezes noted.  No increased work of breathing, no retractions or nasal          

      flaring. Abdomen/GI:  Soft, non-tender, with normal bowel sounds.  No distension or         

      tympany.  No guarding or rebound.  No evidence of tenderness throughout. Back:  No          

      spinal tenderness.  No costovertebral tenderness.  Full range of motion. MS/ Extremity:     

       Pulses equal, no cyanosis.  Neurovascular intact.  Full, normal range of motion.           

      Neuro:  Awake and alert, GCS 15, oriented to person, place, time, and situation.            

      Cranial nerves II-XII grossly intact.  Motor strength 5/5 in all extremities.  Sensory      

      grossly intact.  Cerebellar exam normal.  Normal gait.                                      

                                                                                                  

Vital Signs:                                                                                      

01:39  / 90; Pulse 84; Resp 20; Temp 98.4; Pulse Ox 99% on R/A; Weight 115.67 kg;       aa1 

      Height 5 ft. 3 in. (160.02 cm); Pain 0/10;                                                  

02:40  / 83; Pulse 89; Resp 18; Pulse Ox 99% on R/A; Pain 0/10;                         aa1 

01:39 Body Mass Index 45.17 (115.67 kg, 160.02 cm)                                            aa1 

                                                                                                  

MDM:                                                                                              

01:43 Patient medically screened.                                                             tw4 

03:15 Data reviewed: vital signs, nurses notes. Counseling: I had a detailed discussion with  tw4 

      the patient and/or guardian regarding: the historical points, exam findings, and any        

      diagnostic results supporting the discharge/admit diagnosis, lab results, radiology         

      results. Medication response: albuterol nebulizer treatment(s) relieved the patient's       

      symptoms. The patient is no longer wheezing. Response to treatment: and as a result, I      

      will discharge patient. Special discussion: I discussed with the patient/guardian in        

      detail that at this point there is no indication for admission to the hospital. It is       

      understood, however, that if the symptoms persist or worsen the patient needs to return     

      immediately for re-evaluation.                                                              

                                                                                                  

                                                                                             

01:43 Order name: Chest Pa And Lat (2 Views) XRAY                                             tw4 

                                                                                                  

Administered Medications:                                                                         

01:50 Drug: Albuterol 1.25 mg Route: Inhalation;                                              aa1 

                                                                                                  

                                                                                                  

Disposition:                                                                                      

03:20 Chart complete.                                                                         tw4 

                                                                                                  

Disposition:                                                                                      

19 02:55 Discharged to Home. Impression: Bronchitis, not specified as acute or chronic.     

- Condition is Stable.                                                                            

- Discharge Instructions: Acute Bronchitis, Adult.                                                

- Prescriptions for Tessalon Perles 100 mg Oral Capsule - take 1 capsule by ORAL route            

  every 8 hours As needed; 15 capsule. Zithromax Z- Dany 250 mg Oral Tablet - take 1               

  tablet by ORAL route as directed for 5 days Day 1 - take two (2) tablets one time.              

  Day 2, 3, 4 , 5 take one (1) tablet once daily.; 6 tablet. Albuterol Sulfate 90                 

  mcg/actuation - inhale 1-2 puff by INHALATION route every 4-6 hours; 1 Inhaler.                 

  Guaifenesin AC 10- 100 mg/5 mL Oral Liquid - take 10 milliliter by ORAL route every 4           

  hours As needed; 240 milliliter.                                                                

- Medication Reconciliation Form, Thank You Letter, Antibiotic Education, Prescription            

  Opioid Use form.                                                                                

- Follow up: Private Physician; When: Upon discharge from the Emergency Department;               

  Reason: If symptoms return, Recheck today's complaints, Continuance of care.                    

- Problem is new.                                                                                 

- Symptoms have improved.                                                                         

                                                                                                  

                                                                                                  

                                                                                                  

Signatures:                                                                                       

Dispatcher [employer]                           EDRadha Azar RN                  RN   aa1                                                  

Thanh Greene RN                       RN   jb4                                                  

Samuel Bentley MD MD   tw4                                                  

                                                                                                  

Corrections: (The following items were deleted from the chart)                                    

03:04 02:55 2019 02:55 Discharged to Home. Impression: Bronchitis, not specified as     jb4 

      acute or chronic. Condition is Stable. Forms are Medication Reconciliation Form, Thank      

      You Letter, Antibiotic Education, Prescription Opioid Use. Follow up: Private               

      Physician; When: Upon discharge from the Emergency Department; Reason: If symptoms          

      return, Recheck today's complaints, Continuance of care. Problem is new. Symptoms have      

      improved. tw4                                                                               

                                                                                                  

**************************************************************************************************

## 2019-08-17 NOTE — ER
Nurse's Notes                                                                                     

 [facility]                                                                 

Name: Isabel Resendiz                                                                               

Age: 49 yrs                                                                                       

Sex: Female                                                                                       

: 1970                                                                                   

MRN: [id number]                                                                                   

Arrival Date: 2019                                                                          

Time: :                                                                                       

Account#: [id number]                                                                            

Bed 5                                                                                             

Private MD:                                                                                       

Diagnosis: Bronchitis, not specified as acute or chronic                                          

                                                                                                  

Presentation:                                                                                     

                                                                                             

01:36 Presenting complaint: Patient states: cough and sinus drainage for past couple months.  aa1 

      Reports being seen here for it 1 month ago and was given a z-pack and it improved but       

      then came back again. Transition of care: patient was not received from another setting     

      of care. Onset of symptoms was 2019. Risk Assessment: Do you want to hurt yourself     

      or someone else? Patient reports no desire to harm self or others. Initial Sepsis           

      Screen: Does the patient meet any 2 criteria? No. Patient's initial sepsis screen is        

      negative. Does the patient have a suspected source of infection? No. Patient's initial      

      sepsis screen is negative. Care prior to arrival: None.                                     

01:36 Method Of Arrival: Ambulatory                                                           aa1 

01:36 Acuity: ARNIE 4                                                                           aa1 

                                                                                                  

Historical:                                                                                       

- Allergies:                                                                                      

01:39 PENICILLINS;                                                                            aa1 

- Home Meds:                                                                                      

01:39 Dexilant 60 mg Oral CpDB 1 cap once daily [Active]; ProAir HFA inhalation [Active];     aa1 

      Propranolol Oral as needed [Active]; Zoloft 50 mg Oral tab 1 tab once daily [Active];       

      Zyrtec Oral [Active];                                                                       

- PMHx:                                                                                           

01:39 Anxiety; Depression; Diverticulitis; Hypertension; palpitations; SVT; Tachycardia;      aa1 

      Sleep Apnea;                                                                                

- PSHx:                                                                                           

01:39 ; Tonsillectomy;                                                               aa1 

                                                                                                  

- Immunization history:: Flu vaccine is not up to date.                                           

- Social history:: Smoking status: Patient/guardian denies using tobacco.                         

- Ebola Screening: : Patient denies exposure to infectious person Patient denies travel           

  to an Ebola-affected area in the 21 days before illness onset.                                  

                                                                                                  

                                                                                                  

Screenin:40 Abuse screen: Denies threats or abuse. Denies injuries from another. Nutritional        aa1 

      screening: No deficits noted. Tuberculosis screening: No symptoms or risk factors           

      identified. Fall Risk None identified.                                                      

                                                                                                  

Assessment:                                                                                       

01:40 General: Appears in no apparent distress. comfortable, Behavior is calm, cooperative,   aa1 

      appropriate for age. Pain: Denies pain. Neuro: Level of Consciousness is awake, alert,      

      obeys commands, Oriented to person, place, time, situation, Moves all extremities. Full     

      function Gait is steady, Speech is normal. Cardiovascular: Heart tones S1 S2 present        

      Rhythm is regular. Respiratory: Reports cough that is Airway is patent Respiratory          

      effort is even, unlabored, Respiratory pattern is regular, symmetrical, Breath sounds       

      are clear bilaterally. GI: No signs and/or symptoms were reported involving the             

      gastrointestinal system. : No signs and/or symptoms were reported regarding the           

      genitourinary system. EENT: No signs and/or symptoms were reported regarding the EENT       

      system. Derm: Skin is intact, is healthy with good turgor, Skin is pink, warm \T\ dry.      

      Musculoskeletal: Circulation, motion, and sensation intact. Capillary refill < 3            

      seconds.                                                                                    

02:40 Reassessment: Patient appears in no apparent distress at this time. Patient and/or      aa1 

      family updated on plan of care and expected duration. Pain level reassessed. Patient is     

      alert, oriented x 3, equal unlabored respirations, skin warm/dry/pink. Awaiting x-ray       

      results.                                                                                    

03:02 Reassessment: Patient appears in no apparent distress at this time. Patient is alert,   jb4 

      oriented x 3, equal unlabored respirations, skin warm/dry/pink. Pt verbalized               

      understanding of d/c and follow up instructions. Ambulated out of Ed with steady gait.      

                                                                                                  

Vital Signs:                                                                                      

01:39  / 90; Pulse 84; Resp 20; Temp 98.4; Pulse Ox 99% on R/A; Weight 115.67 kg;       aa1 

      Height 5 ft. 3 in. (160.02 cm); Pain 0/10;                                                  

02:40  / 83; Pulse 89; Resp 18; Pulse Ox 99% on R/A; Pain 0/10;                         aa1 

01:39 Body Mass Index 45.17 (115.67 kg, 160.02 cm)                                            aa1 

                                                                                                  

ED Course:                                                                                        

01:17 Patient arrived in ED.                                                                  es  

01:33 Samuel Bentley MD is Attending Physician.                                            tw4 

01:37 Triage completed.                                                                       aa1 

01:39 Arm band placed on right wrist. Patient placed in an exam room, on a stretcher.         aa1 

01:40 Patient has correct armband on for positive identification. Bed in low position. Call   aa1 

      light in reach. Pulse ox on. NIBP on.                                                       

01:46 Radha Lutz, RN is Primary Nurse.                                                aa1 

02:43 Chest Pa And Lat (2 Views) XRAY In Process Unspecified.                                 EDMS

03:02 No provider procedures requiring assistance completed. Patient did not have IV access   jb4 

      during this emergency room visit.                                                           

                                                                                                  

Administered Medications:                                                                         

01:50 Drug: Albuterol 1.25 mg Route: Inhalation;                                              aa1 

                                                                                                  

                                                                                                  

Outcome:                                                                                          

02:55 Discharge ordered by MD.                                                                tw4 

03:02 Discharged to home ambulatory.                                                          jb4 

03:02 Condition: stable                                                                           

03:02 Discharge instructions given to patient, Instructed on discharge instructions, follow       

      up and referral plans. medication usage, Demonstrated understanding of instructions,        

      follow-up care, medications, Prescriptions given X 4.                                       

03:04 Patient left the ED.                                                                    jb4 

                                                                                                  

Signatures:                                                                                       

Dispatcher [employer]                           EDMS                                                 

Radha Lutz, RN                  RN   aa1                                                  

Evette Webb James, RN                       RN   jb4                                                  

Samuel Bentley MD MD   tw4                                                  

                                                                                                  

**************************************************************************************************

## 2019-12-12 ENCOUNTER — HOSPITAL ENCOUNTER (EMERGENCY)
Dept: HOSPITAL 97 - ER | Age: 49
Discharge: HOME | End: 2019-12-12
Payer: COMMERCIAL

## 2019-12-12 VITALS — TEMPERATURE: 98.5 F

## 2019-12-12 VITALS — DIASTOLIC BLOOD PRESSURE: 71 MMHG | SYSTOLIC BLOOD PRESSURE: 136 MMHG

## 2019-12-12 VITALS — OXYGEN SATURATION: 98 %

## 2019-12-12 DIAGNOSIS — F32.9: ICD-10-CM

## 2019-12-12 DIAGNOSIS — F41.9: ICD-10-CM

## 2019-12-12 DIAGNOSIS — I10: ICD-10-CM

## 2019-12-12 DIAGNOSIS — R00.2: Primary | ICD-10-CM

## 2019-12-12 DIAGNOSIS — Z88.0: ICD-10-CM

## 2019-12-12 LAB
ALBUMIN SERPL BCP-MCNC: 3.6 G/DL (ref 3.4–5)
ALP SERPL-CCNC: 64 U/L (ref 45–117)
ALT SERPL W P-5'-P-CCNC: 68 U/L (ref 12–78)
AST SERPL W P-5'-P-CCNC: 32 U/L (ref 15–37)
BUN BLD-MCNC: 14 MG/DL (ref 7–18)
GLUCOSE SERPLBLD-MCNC: 152 MG/DL (ref 74–106)
HCT VFR BLD CALC: 38.6 % (ref 36–45)
LYMPHOCYTES # SPEC AUTO: 4.6 K/UL (ref 0.7–4.9)
MAGNESIUM SERPL-MCNC: 1.8 MG/DL (ref 1.8–2.4)
PMV BLD: 9.9 FL (ref 7.6–11.3)
POTASSIUM SERPL-SCNC: 3.7 MMOL/L (ref 3.5–5.1)
RBC # BLD: 4.34 M/UL (ref 3.86–4.86)
TROPONIN (EMERG DEPT USE ONLY): < 0.02 NG/ML (ref 0–0.04)

## 2019-12-12 PROCEDURE — 36415 COLL VENOUS BLD VENIPUNCTURE: CPT

## 2019-12-12 PROCEDURE — 84484 ASSAY OF TROPONIN QUANT: CPT

## 2019-12-12 PROCEDURE — 93005 ELECTROCARDIOGRAM TRACING: CPT

## 2019-12-12 PROCEDURE — 80076 HEPATIC FUNCTION PANEL: CPT

## 2019-12-12 PROCEDURE — 83735 ASSAY OF MAGNESIUM: CPT

## 2019-12-12 PROCEDURE — 85025 COMPLETE CBC W/AUTO DIFF WBC: CPT

## 2019-12-12 PROCEDURE — 99284 EMERGENCY DEPT VISIT MOD MDM: CPT

## 2019-12-12 PROCEDURE — 80048 BASIC METABOLIC PNL TOTAL CA: CPT

## 2019-12-12 PROCEDURE — 71045 X-RAY EXAM CHEST 1 VIEW: CPT

## 2019-12-12 NOTE — RAD REPORT
EXAM DESCRIPTION:  RAD - Chest Single View - 12/12/2019 12:33 am

 

CLINICAL HISTORY:  palpitation

Chest pain.

 

COMPARISON:  Chest Pa And Lat (2 Views) dated 8/17/2019; Chest Single View dated 1/19/2017; Chest Sin
gle View dated 1/13/2017

 

FINDINGS:  Portable technique limits examination quality.

 

The lungs are grossly clear. The heart is normal in size. No displaced fractures.

 

IMPRESSION:  No acute intrathoracic process suspected.

## 2019-12-12 NOTE — EDPHYS
Physician Documentation                                                                           

 [facility]                                                                 

Name: Isabel Resendiz                                                                               

Age: 49 yrs                                                                                       

Sex: Female                                                                                       

: 1970                                                                                   

MRN: [id number]                                                                                   

Arrival Date: 2019                                                                          

Time: 00:07                                                                                       

Account#: [id number]                                                                            

Bed 5                                                                                             

Private MD:                                                                                       

ED Physician Nikolas Laboy                                                                      

HPI:                                                                                              

                                                                                             

00:26 This 49 yrs old  Female presents to ER via [facility] with complaints of       la1 

      IRREGULAR HEART BEAT.                                                                       

00:26 The patient presents with a history of heart skipping beats. Context: The symptoms      la1 

      occur at rest, without known cause, and the patient has a history of PVC. Onset: The        

      symptoms/episode began/occurred 3 day(s) ago. Duration: The patient or guardian reports     

      multiple episodes, that are intermittent. Modifying factors: The symptoms are               

      aggravated by nothing. The symptoms are alleviated by nothing. Associated signs and         

      symptoms: Pertinent negatives: chest pain, lightheadedness, nausea, SOB, syncope,           

      near-syncope, vomiting. Severity of symptoms: At their worst the symptoms were mild.        

      The patient has experienced similar episodes in the past. The patient has not recently      

      seen a physician. Pt with hx of PVCs states she had gotten them mostly under control        

      with propanolol but the last three days they have been more frequent.                       

                                                                                                  

OB/GYN:                                                                                           

00:10 LMP 2019                                                                          lp1 

                                                                                                  

Historical:                                                                                       

- Allergies:                                                                                      

00:36 PENICILLINS;                                                                            lp1 

- Home Meds:                                                                                      

00:36 propranolol 10 mg oral tab 1 tab every 8 hours [Active]; Zoloft 100 mg oral tab 2 tabs  lp1 

      once daily [Active]; levocetirizine 5 mg oral tab 1 tab once daily [Active]; Singulair      

      Oral [Active]; Dexilant 60 mg Oral CpDB 1 cap once daily [Active]; olmesartan oral oral     

      [Active];                                                                                   

- PMHx:                                                                                           

00:36 Anxiety; Depression; Diverticulitis; Hypertension; palpitations; Sleep Apnea; SVT;      lp1 

      Tachycardia;                                                                                

- PSHx:                                                                                           

00:36 ; Tonsillectomy;                                                               lp1 

                                                                                                  

- Immunization history:: Adult Immunizations up to date.                                          

- Social history:: Smoking status: Patient/guardian denies using tobacco.                         

- Ebola Screening: : No symptoms or risks identified at this time.                                

                                                                                                  

                                                                                                  

ROS:                                                                                              

00:27 Constitutional: Negative for fever, chills, and weight loss, Eyes: Negative for injury, la1 

      pain, redness, and discharge, ENT: Negative for injury, pain, and discharge, Neck:          

      Negative for injury, pain, and swelling, Cardiovascular: Negative for chest pain,           

      palpitations, and edema, Respiratory: Negative for shortness of breath, cough,              

      wheezing, and pleuritic chest pain, Abdomen/GI: Negative for abdominal pain, nausea,        

      vomiting, diarrhea, and constipation, Back: Negative for injury and pain, MS/Extremity:     

      Negative for injury and deformity, Neuro: Negative for headache, weakness, numbness,        

      tingling, and seizure.                                                                      

                                                                                                  

Exam:                                                                                             

00:28 Constitutional:  This is a well developed, well nourished patient who is awake, alert,  la1 

      and in no acute distress. Head/Face:  Normocephalic, atraumatic. Eyes:  Pupils equal        

      round and reactive to light, extra-ocular motions intact.Periorbital areas with no          

      swelling, redness, or edema. ENT:  .  Mucous membranes moist. Neck:    No Meningismus.      

      Chest/axilla:  Normal chest wall appearance and motion.  Nontender with no deformity.       

      No lesions are appreciated. Cardiovascular:  Regular rate and rhythm with a normal S1       

      and S2.  No gallops, murmurs, or rubs.  Normal PMI, no JVD.  No pulse deficits.             

      Respiratory:  Lungs have equal breath sounds bilaterally, clear to auscultation  No         

      rales, rhonchi or wheezes noted.  No increased work of breathing, no retractions or         

      nasal flaring. Abdomen/GI:  Soft, non-tender, with normal bowel sounds.  No guarding or     

      rebound.  No evidence of tenderness throughout. MS/ Extremity:  Pulses equal, no            

      cyanosis.  Neurovascular intact.  Full, normal range of motion. Neuro:  Awake and           

      alert, GCS 15, oriented to person, place, time, and situation. .  Normal gait. Psych:       

      Awake, alert, with orientation to person, place and time.  Behavior, mood, and affect       

      are within normal limits.                                                                   

                                                                                                  

Vital Signs:                                                                                      

00:10  / 85; Pulse 78; Resp 18; Temp 98.5(O); Pulse Ox 99% on R/A; Weight 117.48 kg;    lp1 

      Height 5 ft. 2 in. (157.48 cm); Pain 0/10;                                                  

00:30  / 82; Pulse 76; Resp 17; Pulse Ox 98% on R/A;                                    lp1 

01:00  / 67; Pulse 75; Resp 20; Pulse Ox 98% on R/A;                                    lp1 

01:53  / 71; Pulse 74; Resp 20; Pulse Ox 98% on R/A; Pain 0/10;                         lp1 

00:10 Body Mass Index 47.37 (117.48 kg, 157.48 cm)                                            lp1 

                                                                                                  

MDM:                                                                                              

00:10 Patient medically screened.                                                             la1 

01:39 Data reviewed: vital signs, nurses notes, lab test result(s), EKG, radiologic studies,  la1 

      I have discussed the patient's presentation/case with the attending Emergency               

      Department Physician; and as a result, I will discharge patient. Data interpreted:          

      Pulse oximetry: on room air is 98 %. Test interpretation: by ED physician or midlevel       

      provider: ECG. Counseling: I had a detailed discussion with the patient and/or guardian     

      regarding: the historical points, exam findings, and any diagnostic results supporting      

      the discharge/admit diagnosis, lab results, radiology results, the need for outpatient      

      follow up, a family practitioner. Special discussion: Based on the patient's history,       

      exam, and Dx evaluation, there is no indication for emergent intervention or inpatient      

      Tx. It is understood by the patient/guardian that if the Sx's persist or worsen they        

      need to return immediately for re-evaluation. I discussed with the patient/guardian in      

      detail that at this point there is no indication for admission to the hospital. It is       

      understood, however, that if the symptoms persist or worsen the patient needs to return     

      immediately for re-evaluation.                                                              

                                                                                                  

                                                                                             

00:18 Order name: Basic Metabolic Panel; Complete Time:                                                                                              

00:18 Order name: CBC with Diff; Complete Time: :                                                                                             

00:18 Order name: LFT's; Complete Time:                                                                                              

00:18 Order name: Magnesium; Complete Time: :18 Order name: Troponin (emerg Dept Use Only); Complete Time:                                                                                              

00:18 Order name: XRAY Chest (1 view)                                                         la                                                                                             

00:18 Order name: EKG; Complete Time: 00:20                                                                                                                                                

00:18 Order name: Cardiac monitoring; Complete Time: 00:36                                    :18 Order name: EKG - Nurse/Tech; Complete Time: 00:36                                      la1 

                                                                                             

00:18 Order name: Labs collected and sent; Complete Time: 00:59                               la1 

                                                                                             

00:18 Order name: O2 Per Protocol; Complete Time: 00:36                                       la1 

                                                                                             

00:18 Order name: O2 Sat Monitoring; Complete Time: 00:36                                     la1 

                                                                                                  

Administered Medications:                                                                         

No medications were administered                                                                  

                                                                                                  

                                                                                                  

Disposition:                                                                                      

07:24 Co-signature as Attending Physician, Nikolas Laboy MD I agree with the assessment and  wa  

      plan of care.                                                                               

                                                                                                  

Disposition:                                                                                      

19 01:41 Discharged to Home. Impression: Palpitations.                                      

- Condition is Stable.                                                                            

- Discharge Instructions: Palpitations.                                                           

                                                                                                  

- Medication Reconciliation Form, Thank You Letter form.                                          

- Follow up: Private Physician; When: 2 - 3 days; Reason: Recheck today's complaints,             

  Re-evaluation by your physician.                                                                

- Problem is new.                                                                                 

- Symptoms have improved.                                                                         

                                                                                                  

                                                                                                  

                                                                                                  

Signatures:                                                                                       

Dispatcher [employer]                           EDMadelyn Soto RN                         RN   lp1                                                  

Yoandy Olsen, LEVP-C                      FNP-Cla1                                                  

Nikolas Laboy MD MD   wa                                                   

                                                                                                  

Corrections: (The following items were deleted from the chart)                                    

01:54 01:41 2019 01:41 Discharged to Home. Impression: Palpitations. Condition is       lp1 

      Stable. Forms are Medication Reconciliation Form, Thank You Letter, Antibiotic              

      Education, Prescription Opioid Use. Follow up: Private Physician; When: 2 - 3 days;         

      Reason: Recheck today's complaints, Re-evaluation by your physician. Problem is new.        

      Symptoms have improved. la1                                                                 

                                                                                                  

**************************************************************************************************

## 2019-12-12 NOTE — EKG
Test Date:    2019-12-12               Test Time:    00:20:48

Technician:   YVROSE                                     

                                                     

MEASUREMENT RESULTS:                                       

Intervals:                                           

Rate:         68                                     

SD:           186                                    

QRSD:         84                                     

QT:           398                                    

QTc:          423                                    

Axis:                                                

P:            39                                     

SD:           186                                    

QRS:          20                                     

T:            41                                     

                                                     

INTERPRETIVE STATEMENTS:                                       

                                                     

Normal sinus rhythm

Normal ECG

Compared to ECG 02/13/2019 15:42:35

Left ventricular hypertrophy no longer present



Electronically Signed On 12-12-19 08:21:36 CST by Raul Garcia

## 2019-12-12 NOTE — ER
Nurse's Notes                                                                                     

 [facility]                                                                 

Name: Isabel Resendiz                                                                               

Age: 49 yrs                                                                                       

Sex: Female                                                                                       

: 1970                                                                                   

MRN: [id number]                                                                                   

Arrival Date: 2019                                                                          

Time: 00:07                                                                                       

Account#: [id number]                                                                            

Bed 5                                                                                             

Private MD:                                                                                       

Diagnosis: Palpitations                                                                           

                                                                                                  

Presentation:                                                                                     

                                                                                             

00:10 Presenting complaint: Patient states: "I have been feeling my PVC's the last couple of  lp1 

      days but I feel like it's gotten worse tonight"; Denies any shortness of breath, chest      

      pain, states feeling PVC's when lying still.                                                

00:10 Transition of care: patient was not received from another setting of care. Onset of     lp1 

      symptoms was 2019. Risk Assessment: Do you want to hurt yourself or            

      someone else? Patient reports no desire to harm self or others. Initial Sepsis Screen:      

      Does the patient meet any 2 criteria? No. Patient's initial sepsis screen is negative.      

      Does the patient have a suspected source of infection? No. Patient's initial sepsis         

      screen is negative. Care prior to arrival: None.                                            

00:10 Method Of Arrival: Wheelchair                                                           lp1 

00:10 Acuity: ARNIE 3                                                                           lp1 

                                                                                                  

OB/GYN:                                                                                           

00:10 LMP 2019                                                                          lp1 

                                                                                                  

Historical:                                                                                       

- Allergies:                                                                                      

00:36 PENICILLINS;                                                                            lp1 

- Home Meds:                                                                                      

00:36 propranolol 10 mg oral tab 1 tab every 8 hours [Active]; Zoloft 100 mg oral tab 2 tabs  lp1 

      once daily [Active]; levocetirizine 5 mg oral tab 1 tab once daily [Active]; Singulair      

      Oral [Active]; Dexilant 60 mg Oral CpDB 1 cap once daily [Active]; olmesartan oral oral     

      [Active];                                                                                   

- PMHx:                                                                                           

00:36 Anxiety; Depression; Diverticulitis; Hypertension; palpitations; Sleep Apnea; SVT;      lp1 

      Tachycardia;                                                                                

- PSHx:                                                                                           

00:36 ; Tonsillectomy;                                                               lp1 

                                                                                                  

- Immunization history:: Adult Immunizations up to date.                                          

- Social history:: Smoking status: Patient/guardian denies using tobacco.                         

- Ebola Screening: : No symptoms or risks identified at this time.                                

                                                                                                  

                                                                                                  

Screenin:37 Abuse screen: Denies threats or abuse. Denies injuries from another. Nutritional        lp1 

      screening: No deficits noted. Tuberculosis screening: No symptoms or risk factors           

      identified. Fall Risk None identified.                                                      

                                                                                                  

Assessment:                                                                                       

00:20 General: Appears in no apparent distress. Behavior is cooperative, appropriate for age. lp1 

      Pain: Denies pain. Neuro: Level of Consciousness is awake, alert, obeys commands,           

      Oriented to person, place, time, situation. Cardiovascular: Reports palpitations,           

      Patient's skin is warm and dry. Respiratory: Respiratory effort is even, unlabored,         

      Respiratory pattern is regular, Breath sounds are clear bilaterally. GI: Abdomen is         

      non-distended, obese. : No signs and/or symptoms were reported regarding the              

      genitourinary system. EENT: No signs and/or symptoms were reported regarding the EENT       

      system. Derm: Skin is pink, warm \T\ dry. Musculoskeletal: No deficits noted.               

01:00 Reassessment: Patient appears in no apparent distress at this time. Patient taking own  lp1 

      home med of Propanolol at this time.                                                        

01:53 Reassessment: Patient appears in no apparent distress at this time. Patient is alert,   lp1 

      oriented x 3, equal unlabored respirations, skin warm/dry/pink. Patient states feeling      

      better. Patient states symptoms have improved.                                              

                                                                                                  

Vital Signs:                                                                                      

00:10  / 85; Pulse 78; Resp 18; Temp 98.5(O); Pulse Ox 99% on R/A; Weight 117.48 kg;    lp1 

      Height 5 ft. 2 in. (157.48 cm); Pain 0/10;                                                  

00:30  / 82; Pulse 76; Resp 17; Pulse Ox 98% on R/A;                                    lp1 

01:00  / 67; Pulse 75; Resp 20; Pulse Ox 98% on R/A;                                    lp1 

01:53  / 71; Pulse 74; Resp 20; Pulse Ox 98% on R/A; Pain 0/10;                         lp1 

00:10 Body Mass Index 47.37 (117.48 kg, 157.48 cm)                                            lp1 

                                                                                                  

ED Course:                                                                                        

00:07 Patient arrived in ED.                                                                  ag3 

00:10 Madelyn Toussaint, RN is Primary Nurse.                                                       lp1 

00:10 Yoandy Olsen FNP-C is PHCP.                                                             la1 

00:10 Nikolas Laboy MD is Attending Physician.                                             la1 

00:33 Triage completed.                                                                       lp1 

00:34 Arm band placed on.                                                                     lp1 

00:37 XRAY Chest (1 view) In Process Unspecified.                                             EDMS

00:38 Patient has correct armband on for positive identification. Placed in gown. Bed in low  lp1 

      position. Call light in reach. Cardiac monitor on. Pulse ox on. NIBP on.                    

01:53 No provider procedures requiring assistance completed. Patient did not have IV access   lp1 

      during this emergency room visit.                                                           

                                                                                                  

Administered Medications:                                                                         

No medications were administered                                                                  

                                                                                                  

                                                                                                  

Outcome:                                                                                          

01:41 Discharge ordered by MD.                                                                laOlive 

01:54 Discharged to home ambulatory.                                                          lp1 

01:54 Condition: good                                                                             

01:54 Discharge instructions given to patient, Instructed on discharge instructions, follow       

      up and referral plans. Demonstrated understanding of instructions, follow-up care.          

01:54 Patient left the ED.                                                                    lp1 

                                                                                                  

Signatures:                                                                                       

Dispatcher [employer]                           EDMadelyn Soto RN                         RN   lp1                                                  

Yoandy Olsen, FNP-C                      FNP-Cla1                                                  

Maria Alejandra Smith                                 ag3                                                  

                                                                                                  

**************************************************************************************************

## 2023-02-17 ENCOUNTER — HOSPITAL ENCOUNTER (EMERGENCY)
Dept: HOSPITAL 97 - ER | Age: 53
Discharge: HOME | End: 2023-02-17
Payer: COMMERCIAL

## 2023-02-17 VITALS — DIASTOLIC BLOOD PRESSURE: 78 MMHG | OXYGEN SATURATION: 99 % | SYSTOLIC BLOOD PRESSURE: 138 MMHG

## 2023-02-17 DIAGNOSIS — I10: ICD-10-CM

## 2023-02-17 DIAGNOSIS — H65.02: Primary | ICD-10-CM

## 2023-02-17 DIAGNOSIS — H81.392: ICD-10-CM

## 2023-02-17 DIAGNOSIS — Z88.0: ICD-10-CM

## 2023-02-17 LAB
ALBUMIN SERPL BCP-MCNC: 3.5 G/DL (ref 3.4–5)
ALP SERPL-CCNC: 51 U/L (ref 45–117)
ALT SERPL W P-5'-P-CCNC: 22 U/L (ref 13–56)
AST SERPL W P-5'-P-CCNC: 16 U/L (ref 15–37)
BUN BLD-MCNC: 10 MG/DL (ref 7–18)
GLUCOSE SERPLBLD-MCNC: 101 MG/DL (ref 74–106)
HCT VFR BLD CALC: 37.8 % (ref 36–45)
LIPASE SERPL-CCNC: 100 U/L (ref 73–393)
LYMPHOCYTES # SPEC AUTO: 3.7 K/UL (ref 0.7–4.9)
MCV RBC: 91.3 FL (ref 80–100)
PMV BLD: 8.7 FL (ref 7.6–11.3)
POTASSIUM SERPL-SCNC: 3.8 MMOL/L (ref 3.5–5.1)
RBC # BLD: 4.14 M/UL (ref 3.86–4.86)
TROPONIN I SERPL HS-MCNC: 4 PG/ML (ref ?–58.9)

## 2023-02-17 PROCEDURE — 83690 ASSAY OF LIPASE: CPT

## 2023-02-17 PROCEDURE — 85025 COMPLETE CBC W/AUTO DIFF WBC: CPT

## 2023-02-17 PROCEDURE — 93005 ELECTROCARDIOGRAM TRACING: CPT

## 2023-02-17 PROCEDURE — 84484 ASSAY OF TROPONIN QUANT: CPT

## 2023-02-17 PROCEDURE — 36415 COLL VENOUS BLD VENIPUNCTURE: CPT

## 2023-02-17 PROCEDURE — 80053 COMPREHEN METABOLIC PANEL: CPT

## 2023-02-17 PROCEDURE — 99284 EMERGENCY DEPT VISIT MOD MDM: CPT

## 2023-02-17 NOTE — EDPHYS
Physician Documentation                                                                           

 [facility]                                                                 

Name: Isabel Resendiz                                                                               

Age: 52 yrs                                                                                       

Sex: Female                                                                                       

: 1970                                                                                   

MRN: [id number]                                                                                   

Arrival Date: 2023                                                                          

Time: 15:32                                                                                       

Account#: [id number]                                                                            

Bed 7                                                                                             

Private MD:                                                                                       

[facility] Physician Walker Euceda                                                                     

HPI:                                                                                              

                                                                                             

16:33 This 52 yrs old Female presents to ER via Ambulatory with complaints of Vertigo, Ear    rt  

      Pain.                                                                                       

16:33 Patient with history of multiple episodes of vertigo presents to the ED with vertigo    rt  

      this morning that has since resolved. The patient does state that she has had a chronic     

      left ear pain, worse since today with feelings of fluid in the ear. Radiates down to        

      her jaw. The patient reports a sore throat. The patient also reports a very mild            

      epigastric pain that is improving she had mild nausea, none currently. Denies other         

      acute complaints at this time. Symptoms are mild in severity, no other aggravating or       

      alleviating factors..                                                                       

                                                                                                  

Historical:                                                                                       

- Allergies:                                                                                      

15:39 PENICILLINS;                                                                            iw  

- PMHx:                                                                                           

15:39 Anxiety; Depression; Diverticulitis; Hypertension; palpitations; Sleep Apnea; SVT;      iw  

      Tachycardia;                                                                                

                                                                                                  

- Immunization history:: Adult Immunizations.                                                     

- Social history:: Smoking status: .                                                              

- Family history:: not pertinent.                                                                 

                                                                                                  

                                                                                                  

ROS:                                                                                              

16:33 Constitutional: Negative for fever, chills, and weight loss, Neck: Negative for injury, rt  

      pain, and swelling, Cardiovascular: Negative for chest pain, palpitations, and edema,       

      Respiratory: Negative for shortness of breath, cough, wheezing, and pleuritic chest         

      pain, Skin: Negative for injury, rash, and discoloration, Neuro: Negative for headache,     

      weakness, numbness, tingling, and seizure, Psych: Negative for depression, anxiety,         

      suicide ideation, homicidal ideation, and hallucinations.                                   

16:33 ENT: Positive for Otalgia, negative for discharge.                                          

16:33 Abdomen/GI: Positive for abdominal pain, nausea.                                            

16:33 Neuro: Positive for Vertigo, negative for weakness.                                         

                                                                                                  

Exam:                                                                                             

16:33 Constitutional:  This is a well developed, well nourished patient who is awake, alert,  rt  

      and in no acute distress. Chest/axilla:  Normal chest wall appearance and motion.           

      Nontender with no deformity.  No lesions are appreciated. Cardiovascular:  Regular rate     

      and rhythm with a normal S1 and S2.  No gallops, murmurs, or rubs.  Normal PMI, no JVD.     

       No pulse deficits. Respiratory:  Lungs have equal breath sounds bilaterally, clear to      

      auscultation and percussion.  No rales, rhonchi or wheezes noted.  No increased work of     

      breathing, no retractions or nasal flaring. Abdomen/GI:  Soft, non-tender, with normal      

      bowel sounds.  No distension or tympany.  No guarding or rebound.  No evidence of           

      tenderness throughout. Skin:  Warm, dry with normal turgor.  Normal color with no           

      rashes, no lesions, and no evidence of cellulitis. MS/ Extremity:  Pulses equal, no         

      cyanosis.  Neurovascular intact.  Full, normal range of motion. Neuro:  Awake and           

      alert, GCS 15, oriented to person, place, time, and situation.  Cranial nerves II-XII       

      grossly intact.  Motor strength 5/5 in all extremities.  Sensory grossly intact.            

      Cerebellar exam normal.  Normal gait. Psych:  Awake, alert, with orientation to person,     

      place and time.  Behavior, mood, and affect are within normal limits.                       

16:33 ENT: Left TM is bulging with erythema.  Posterior pharyngeal erythema with exudates are     

      present, no tonsillar hypertrophy.                                                          

16:33 ECG was reviewed by the Attending Physician.                                                

                                                                                                  

Vital Signs:                                                                                      

15:39  / 80; Pulse 65; Resp 16; Pulse Ox 97% on R/A; Weight 117.93 kg; Height 5 ft. 2   iw  

      in. (157.48 cm); Pain 6/10;                                                                 

16:44  / 78; Pulse 62; Resp 16; Pulse Ox 99% ;                                          ko1 

15:39 Body Mass Index 47.55 (117.93 kg, 157.48 cm)                                            iw  

                                                                                                  

MDM:                                                                                              

15:43 Patient medically screened.                                                             rt  

17:11 Differential diagnosis: otitis media, Central vertigo, peripheral vertigo. Data         rt  

      reviewed: vital signs, nurses notes, lab test result(s), EKG, radiologic studies.           

      Consideration of Admission/Observation Escalation of care including                         

      admission/observation considered. Test considered but Not performed: CT: Patient with       

      very benign abdominal examination, unremarkable labs, shared decision-making was            

      performed, believe patient is at low risk, not requiring CT scan of the abdomen.            

      Patient with no focal neurologic deficits, previous episodes of peripheral vertigo, CT      

      scan of the head not indicated.. Counseling: I had a detailed discussion with the           

      patient and/or guardian regarding: the historical points, exam findings, and any            

      diagnostic results supporting the discharge/admit diagnosis, lab results, radiology         

      results, the need for outpatient follow up.                                                 

                                                                                                  

                                                                                             

15:54 Order name: CBC with Diff; Complete Time: 17:02                                         rt  

                                                                                             

15:54 Order name: CMP; Complete Time: 17:06                                                   rt  

                                                                                             

15:54 Order name: Lipase; Complete Time: 17:                                                rt  

                                                                                             

15:54 Order name: Troponin High Sensitivity; Complete Time: 17:06                             rt  

                                                                                             

15:54 Order name: EKG; Complete Time: 15:55                                                   rt  

                                                                                             

15:54 Order name: EKG - Nurse/Tech; Complete Time: 16:10                                      rt  

                                                                                                  

EC:33 Rate is 58 beats/min. Rhythm is regular, Normal Sinus Rhythm with No ectopy. QRS Axis   rt  

      is Normal. CT interval is normal. QRS interval is normal. QT interval is normal. No Q       

      waves. T waves are Normal. No ST changes noted.                                             

                                                                                                  

Administered Medications:                                                                         

No medications were administered                                                                  

                                                                                                  

                                                                                                  

Disposition Summary:                                                                              

23 17:09                                                                                    

Discharge Ordered                                                                                 

      Location: Home                                                                          rt  

      Problem: an ongoing problem                                                             rt  

      Symptoms: have improved                                                                 rt  

      Condition: Stable                                                                       rt  

      Diagnosis                                                                                   

        - Acute serous otitis media, left ear                                                 rt  

        - Other peripheral vertigo, left ear                                                  rt  

      Followup:                                                                               rt  

        - With: Private Physician                                                                  

        - When: 2 - 3 days                                                                         

        - Reason:                                                                                  

      Discharge Instructions:                                                                     

        - Discharge Summary Sheet                                                             rt  

        - Benign Positional Vertigo                                                           rt  

        - Otitis Media, Adult                                                                 rt  

      Forms:                                                                                      

        - Medication Reconciliation Form                                                      rt  

        - Thank You Letter                                                                    rt  

        - Antibiotic Education                                                                rt  

        - Prescription Opioid Use                                                             rt  

      Prescriptions:                                                                              

        - azithromycin 250 mg Oral tablet                                                          

            - take 1 tablet by ORAL route once daily Take 2 tablets for the first dose, then  rt  

      1 tablet for every dose after.; 6 tablet; Refills: 0, Product Selection Permitted           

Signatures:                                                                                       

Dispatcher [employer]                           Samaria Stevens RN                     RN   Mechelle Dow RN RN hb [place], Ryan, MD MD   rt                                                   

                                                                                                  

**************************************************************************************************

## 2023-02-17 NOTE — XMS REPORT
Continuity of Care Document

                          Created on:2023



Patient:BRENDAN LOYD

Sex:Female

:1970

External Reference #:[id number]





Demographics







                          Address                   [address]



                                                    [place] [address]

 

                          Home Phone                [phone]

 

                          Work Phone                [phone]

 

                          Mobile Phone              [phone]

 

                          Email Address             [email or web address]

 

                          Preferred Language        English

 

                          Marital Status            Unknown

 

                          Sikh Affiliation     Unknown

 

                          Race                      Unknown

 

                          Additional Race(s)        Unavailable



                                                    White

 

                          Ethnic Group              Unknown









Author







                          Organization              [facility]

t

 

                          Address                   [address]



                                                    [place] [address]

 

                          Phone                     [phone]









Support







                Name            Relationship    Address         Phone

 

                DAISY VENTURA               Unavailable     [phone]

 

                ELIDA HOOPER               Unavailable     [phone]









Care Team Providers







                    Name                Role                Phone

 

                    Denice TRIPATHI, Raina Primary Care Physician [phone]

 

                    Elmira WOO, Carolina De La Fuente Attending Clinician [phone]

 

                    Kalyn TRIPATHI, Kenyatta Attending Clinician [phone]

 

                    PITA MERIDA   Attending Clinician Unavailable

 

                    LETY PEACE  Attending Clinician Unavailable









Payers







           Payer Name Policy Type Policy Number Effective Date Expiration Date S

[facility]            [id number]   2021            



           RESOURCES CHOICE                       00:00:00              



           POS                                                    







Problems

This patient has no known problems.



Allergies, Adverse Reactions, Alerts







       Allergy Allergy Status Severity Reaction(s) Onset  Inactive Treating Comm

ents 

Source



       Name   Type                        Date   Date   Clinician        

 

       PENICILL Allergy Active               2021                      [facility]



       IN                                                         [facility]



                                          [facility]                      [facility]



                                          00                          Center

 

       [facility]enicill Propensi Active               2021                      [facility]



       in     ty to                                               [facility]



              adverse                      00:00:                      Medical



              reaction                      00                          Center



              s                                                       

 

       Penicill Propensi Active        Other (See                       Me

thodi



       ins    ty to                Comments) 3-29                        st



              adverse                      00:00:                      Hospita



              reaction                      00                          l



              s to                                                    



              drug                                                    







Family History







           Family Member Diagnosis  Comments   Start Date Stop Date  Source

 

           Natural father Anxiety disorder                                  [facility]

 

           Natural father Depression                                  [facility]

 

           [facility]ension                                  [facility]

[facility]

 

           Paternal grandmother Emphysema                                   [facility]







Social History







           Social Habit Start Date Stop Date  Quantity   Comments   Source

 

           History of                       Current smoker            [facility]



           tobacco use                                             Hospital

 

           History SDOH                                             [facility]



           Alcohol Std                                             Hospital



           Drinks                                                 

 

           History SDOH                                             [facility]



           Alcohol Binge                                             [facility]ospital

 

           History [facility]                                             [facility]



           Alcohol Comment                                             [facility]

enter

 

           Alcohol intake 2022 Lifetime              [facility]



                      00:00:00   00:00:00   non-drinker            Hospital



                                            (finding)             

 

           Tobacco use and 2021 Smokeless tobacco            [facility]

[facility]



           exposure   00:00:00   00:00:00   non-user              Hospital

 

           History SDOH 2021 1                     [facility]



           Alcohol Frequency 00:00:00   00:00:00                         Hospita

l

 

           Education  2021 16                    [facility]



                      00:00:00   00:00:00                         Hospital

 

           Sex Assigned At 1970                       [facility]



           [facility]      [facility]   [facility]00                         [facility]









                Smoking Status  Start Date      Stop Date       Source

 

                Never smoker                                    [facility]

 

                Ex-smoker       2021 00:00:00 2021 [facility]:00 [facility]







Medications







       Ordered Filled Start  Stop   Current Ordering Indication Dosage Frequency

 Signature

                    Comments            Components          Source



     Medication Medication Date Date Medication? Clinician                (SIG) 

          



     Name Name                                                   

 

     propranoloL            Yes                      TAKE 1           Meth

manoj



     (INDERAL)      1-03                               TABLET BY           st



     10 MG      00:00:                               MOUTH           Hospita



     tablet      00                                 THREE           l



                                                  TIMES A           



                                                  DAY            

 

     olmesartan      2022      Yes                      TAKE 1           Metho

di



     (BENICAR)      2-06                               TABLET BY           st



     40 MG      00:00:                               MOUTH           Hospita



     tablet      00                                 EVERY DAY           l

 

     dicyclomine            Yes                                     Method

i



     HCl (BENTYL      17                                              st



     IM)       14:07:                                              Hospita



               19                                                l

 

     clonAZEPAM            Yes                                     [facility]



     (KlonoPIN)      17                                              st



     0.5 MG      14:07:                                              Hospita



     disintegrat      19                                                l



     ing tablet                                                        

 

     ciprofloxac            Yes                                     Method

i



     in (Cipro)      17                                              st



     500 mg/5 mL      14:07:                                              Hospit

a



     suspension      19                                                l

 

     pantoprazol            Yes            40mg QD   Take 40 mg           

[facility]



     e         -17                               by mouth           st



     (PROTONIX)      14:07:                               daily.           Hospi

ta



     40 MG EC      19                                                l



     tablet                                                        

 

     metroNIDAZO            Yes            500mg Q.[id number] Take 500      

     [facility]



     LE (FLAGYL)      1-17                          [id number] mg by           s

t



     500 MG      14:07:                          3D   mouth 3           Hospita



     tablet      19                                 (three)           l



                                                  times a           



                                                  day.           

 

     montelukast            Yes                                     Method

i



     (SINGULAIR)      -17                                              st



     10 mg      14:07:                                              Hospita



     tablet      19                                                l

 

     propranoloL      2023- No             10mg Q.[id number] Take 1        

   [facility]



     (INDERAL)       01-03                     [id number] tablet (10        

   st



     10 MG      00:00: 00:00                     3D   mg total)           Hospit

a



     tablet      00   :00                           by mouth 3           l



                                                  (three)           



                                                  times a           



                                                  day.           

 

     olmesartan       No             40mg QD   Take 1           Meth

manoj



     (BENICAR)       1206                          tablet (40           st



     40 MG      00:00: 00:00                          mg total)           Hospit

a



     tablet      00   :00                           by mouth           l



                                                  daily.           

 

     sertraline      2021      Yes            100mg QD   Take 100           [facility]

I [facility]



     (ZOLOFT)      1-02                               mg by           [facility]



     100 MG      16:19:                               mouth           Medical



     tablet      53                                 daily.           [facility]

 

     olmesartan      2021      Yes            20mg QD   Take 20 mg           C

[facility]



     (BENICAR)      02                               by mouth           [facility]



     20 MG      16:19:                               daily.           Medical



     tablet      53                                                [facility]

 

     levocetiriz      2021      Yes            5mg  QD   Take 5 mg           C

[facility]



     ine (XYZAL)      02                               by mouth           [facility]

s



     5 MG tablet      16:19:                               every           Medic

al



               53                                 evening.           [facility]

 

     famotidine      2021      Yes            20mg Q.5D Take 20 mg           C

[facility]



     (PEPCID) 20      -02                               by mouth 2           [facility]

kes



     MG tablet      16:19:                               (two)           Medical



               53                                 times           Center



                                                  daily.           

 

     propranoloL      2021      Yes            10mg Q.[id number] Take 10 mg     

      [facility]



     (INDERAL)      1-02                          [id number] by mouth 3         

  [facility]



     10 MG      16:19:                          3D   (three)           Medical



     tablet      53                                 times           Center



                                                  daily.           

 

     clonazePAM      2021      Yes            .5mg      Take 0.5           [facility]



     (KlonoPIN)      1-02                               mg by           [facility]



     0.5 MG      16:19:                               mouth 2           Medical



     tablet      53                                 (two)           Center



                                                  times           



                                                  daily as           



                                                  needed for           



                                                  Anxiety.           

 

     hydrOXYzine      2021      Yes            25mg      Take 25 mg           

[facility]



     (ATARAX) 25      -02                               by mouth 3           [facility]

kes



     MG tablet      16:19:                               (three)           Medic

al



               53                                 times           Center



                                                  daily as           



                                                  needed for           



                                                  Itching.           







Vital Signs







             Vital Name   Observation Time Observation Value Comments     Source

 

             HEIGHT       2021 15:50:00 157.5 cm                  

 

             WEIGHT       2021 15:50:00 113.399 kg                

 

             HEIGHT       2021 15:50:00 157.5 cm                  

 

             WEIGHT       2021 15:50:00 113.399 kg                







Procedures

This patient has no known procedures.



Plan of Care







             Planned Activity Planned Date Details      Comments     Source

 

             Future Scheduled 2023   Hepatitis C screening              [facility]



             Test         14:12:59     (procedure) [code =              



                                       220029676]                

 

             Future Scheduled 2023   COLONOSCOPY SCREENING              [facility]



             Test         14:12:59     [code = COLONOSCOPY              



                                       SCREENING]                

 

             Future Scheduled 2023   COVID-19 VACCINE (3 -              [facility]



             Test         14:12:59     Booster for Moderna              



                                       series) [code =              



                                       COVID-19 VACCINE (3 -              



                                       Booster for Moderna              



                                       series)]                  

 

             Future Scheduled 2023   BREAST CANCER              [facility]



             Test         14:12:59     SCREENING [code =              



                                       BREAST CANCER              



                                       SCREENING]                

 

             Future Scheduled 2023   INFLUENZA VACCINE              [facility]

[facility]



             Test         14:12:59     [code = INFLUENZA              



                                       VACCINE]                  

 

             Future Scheduled 2023   SHINGLES VACCINES (1              Met

[facility]



             Test         14:12:59     of 2) [code = SHINGLES              



                                       VACCINES (1 of 2)]              

 

             Future Scheduled 2023   Screening for              [facility]



             Test         14:12:59     malignant neoplasm of              



                                       cervix (procedure)              



                                       [code = 073905903]              

 

             Future Scheduled 2023   DEPRESSION SCREENING              [facility]



             Test         00:00:00     (12+) [code =              [facility]



                                       DEPRESSION SCREENING              



                                       (12+)]                    

 

             Future Scheduled 2022   INFLUENZA VACCINE (#1)              [facility]

[facility]



             Test         00:00:00     [code = INFLUENZA              Medical Ce

nter



                                       VACCINE (#1)]              

 

             Future Scheduled 2022   SHINGLES VACCINES (1              [facility]



             Test         00:00:00     of 2) [code = SHINGLES              Medic

al Center



                                       VACCINES (1 of 2)]              

 

             Future Scheduled 2017   Lipid panel               [facility]

s



             Test         00:00:00     (procedure) [code =              [facility] 

Center



                                       75101890]                 

 

             Future Scheduled 1993   Screening for              [facility]

es



             Test         00:00:00     malignant neoplasm of              Medica

l Center



                                       cervix (procedure)              



                                       [code = 181429445]              

 

             Future Scheduled 1991   DTAP/TDAP/TD VACCINES              [facility]

[facility]



             Test         00:00:00     (1 - Tdap) [code =              Medical C

enter



                                       DTAP/TDAP/TD VACCINES              



                                       (1 - Tdap)]               

 

             Future Scheduled 1990   HEPATITIS C SCREENING              [facility]

[facility]



             Test         00:00:00     [code = HEPATITIS C              Medical 

Center



                                       SCREENING]                

 

             Future Scheduled 1984   Tobacco Cessation              [facility]



             Test         00:00:00     Counseling and              Medical Cente

r



                                       Screening (12+) [code              



                                       = Tobacco Cessation              



                                       Counseling and              



                                       Screening (12+)]              

 

             Future Scheduled 1973   COVID-19 VACCINE (#1)              [facility]

[facility]



             Test         00:00:00     [code = COVID-19              Medical Ron

ter



                                       VACCINE (#1)]              

 

             Future Scheduled 1972   Screening for              [facility]

es



             Test         00:00:00     malignant neoplasm of              Medica

l Center



                                       breast (procedure)              



                                       [code = 517595633]              

 

             Future Scheduled 1972   CT Colonography              [facility]

[facility]



             Test         00:00:00     (combo) [code = CT              Medical C

enter



                                       Colonography (combo)]              

 

             Future Scheduled 1972   Screening for              [facility]

es



             Test         00:00:00     malignant neoplasm of              Medica

l Center



                                       colon (procedure)              



                                       [code = 593762092]              

 

             Future Scheduled 1972   Screening for              [facility]

es



             Test         00:00:00     malignant neoplasm of              Medica

l Center



                                       colon (procedure)              



                                       [code = 269826052]              

 

             Future Scheduled 1972   Screening for              [facility]

es



             Test         00:00:00     malignant neoplasm of              Medica

l Center



                                       colon (procedure)              



                                       [code = 382158387]              

 

             Future Scheduled 1972   Screening for              [facility]

es



             Test         00:00:00     malignant neoplasm of              Medica

l Center



                                       colon (procedure)              



                                       [code = 779093576]              

 

             Future Scheduled 1972   Sigmoidoscopy [code =              [facility]



             Test         00:00:00     Sigmoidoscopy]              Medical Cente

r







Encounters







        Start   End     Encounter Admission Attending Care    Care    Encounter 

Source



        Date/Time Date/Time Type    Type    Clinicians Facility Department ID   

   

 

        2022 Refill          Ku, 1.2.840.1 [id number]

[id number] [facility]



        00:00:00 00:00:00                 Carolina Leisa [id number]         [address]



                                                3.430.2.7                 Hospit

a



                                                .3.248467                 l



                                                .8                      

 

        2022 Refill          Kalyn, 1.2.840.1 [id number]

[id number] [facility]



        00:00:00 00:00:00                 Kenyatta  74136.1.1         [address]



                                                3.430.2.7                 Hospit

a



                                                .3.720468                 l



                                                .8                      

 

        2022 Outpatient         KU, [facility]     [id number]

[address] [place]



        00:00:00 00:00:00                 CAROLINA                   127     Method

i



                                                                        

 

        2021-12-15 2021-12-15 Outpatient         FLORINAAPULI, [facility]     [id number]

[address] [place]



        00:00:00 00:00:00                 KENYATTA                  428     Method

i



                                                                        

 

        2021 Outpatient         KUNAPULI, [facility]     [id number]

[id number] [place]



        00:00:00 00:00:00                 KENYATTA                  339     Method

i



                                                                        

 

        2021 Outpatient         DESPLINTER, [facility]     210

[id number] [place]



        00:00:00 00:00:00                 PITA                   [id number]     Method

i



                                                                        

 

        2021 Emergency ER      BOSS,   [facility]    Emergency [id number]

[id number] [facility]



        15:53:00 20:11:00                 LETY                          

 

        2021-10-05 2021-10-05 Outpatient         KUNAPULI, [facility]     [id number]

[address] [place]



        00:00:00 00:00:00                 KENYATTA                  352     Method

i



                                                                        

 

        2021 Emergency                 [facility]     Ange     [id number]

75 [place]



        00:00:00 00:00:00                                         621     Method

i



                                                                        

 

        2021 Outpatient         DESPLINTER, [facility]     210

[id number] [place]



        00:00:00 00:00:00                 PITA                   [id number]     Method

i



                                                                        

 

        2021 Outpatient         DESPLINTER, [facility]     210

[id number] [place]



        00:00:00 00:00:00                 PITA                   585     Method

i



                                                                        st

 

        2021 Outpatient         FUAD [facility]     210

[id number] [place]



        00:00:00 00:00:00                 PITA                   405     Method

i



                                                                        st







Results







           Test Description Test Time  Test Comments Results    Result     Sourc

e



                                                       Comments   

 

           CT, ABDOMEN 2021 Reason for ************************           

 



                      19:17:00   exam:->diffuse ************************        

    



                                 abd pain,  ************[facility] (formerly [facility]3]   - MEDICAL CENTERName:            



                                            BRENDAN LOYD :            



                                            1970 Sex:            



                                            F***********************            



                                            ************************            



                                            *************FINAL            



                                            REPORT PATIENT ID:            



                                            [id number] CT, ABDOMEN \T\            



                                            PELVIS, WITH IV CONTRAST            



                                            HISTORY: Abdominal pain,            



                                            acute,                



                                            nonlocalizeddiffuse abd            



                                            pain, diarrhea            



                                            COMPARISON: None            



                                            TECHNIQUE: CT of the            



                                            abdomen and pelvis WITH            



                                            intravenous contrast and            



                                            WITHOUT oral contrast.            



                                            The examination was            



                                            performed according to            



                                            the departmental            



                                            dose-optimization            



                                            program, which includes            



                                            automated exposure            



                                            control, adjustment of            



                                            the mA and/or kV            



                                            according to patient            



                                            size and/or use of            



                                            iterative reconstruction            



                                            technique. FINDINGS:            



                                            Lower thorax: Trace            



                                            bilateral pleural            



                                            effusions.Liver: Mildly            



                                            decreased in attenuation            



                                            diffusely. No solid            



                                            mass.Gallbladder and            



                                            bile ducts:            



                                            Unremarkable.Spleen:            



                                            Markedly              



                                            enlarged.Pancreas:            



                                            Unremarkable.Adrenals:            



                                            UnremarkableKidneys and            



                                            ureters:              



                                            Unremarkable.Bowel:            



                                            Normal appendix.            



                                            Nondilated bowel with no            



                                            wall thickening. Colonic            



                                            diverticulosis without            



                                            associated colonic wall            



                                            thickening or            



                                            surrounding            



                                            stranding.Bladder:            



                                            Unremarkable.Reproductiv            



                                            e organs: A unilocular            



                                            simple appearing 6.5 cm            



                                            right adnexal cyst. A            



                                            solid appearing 3.6 cm            



                                            lesion in the left            



                                            adnexa is most likely a            



                                            subserosal fibroid is            



                                            contiguous with the left            



                                            half of the uterus.Lymph            



                                            nodes:                



                                            Unremarkable.Peritoneum:            



                                            Unremarkable.Vessels:            



                                            Mild atherosclerotic            



                                            calcifications.Abdominal            



                                            wall: Small            



                                            fat-containing right            



                                            greater than left            



                                            inguinal hernia.Bones:            



                                            Mild degenerative            



                                            changes of the lumbar            



                                            spine worst at L5-S1.            



                                            IMPRESSION: 1.No acute            



                                            findings in the abdomen            



                                            or pelvis. 2.Colonic            



                                            diverticulosis.            



                                            3.Splenomegaly and            



                                            suggestion of hepatic            



                                            steatosis. 4.A            



                                            unilocular simple            



                                            appearing 6.5 cm right            



                                            adnexal cyst. Recommend            



                                            ultrasound follow-up in            



                                            3-6 months. 5.A solid            



                                            appearing 3.6 cm lesion            



                                            in the left adnexa is            



                                            most likely a subserosal            



                                            fibroid. Pelvic            



                                            ultrasound could be            



                                            performed today to            



                                            confirm this. Signed:            



                                            Alfie Cam MDReport            



                                            Verified Date/Time:            



                                            2021 19:17:10            



                                            Reading Location: [address] Transitional            



                                            Reading Room            



                                            Electronically signed            



                                            by: ALFIE CAM MD on 2021 07:17            



                                            PM                    









                    COMPREHENSIVE METABOLIC PANEL 2021 17:21:46 









                      Test Item  Value      Reference Range Interpretation Comme

nts









             TOTAL PROTEIN (BEAKER) 7.3 gm/dL    6.0-8.5                   



             (test code = 770)                                        

 

             ALBUMIN (BEAKER) (test code 4.2 g/dL     3.5-5.0                   



             = 1145)                                             

 

             ALKALINE PHOSPHATASE 47 U/L                           



             (BEAKER) (test code = 346)                                        

 

             BILIRUBIN TOTAL (BEAKER) 0.5 mg/dL    0.1-1.2                   



             (test code = 377)                                        

 

             SODIUM (BEAKER) (test code 141 meq/L    135-148                   



             = 381)                                              

 

             POTASSIUM (BEAKER) (test 3.7 meq/L    3.6-5.5                   



             code = 379)                                         

 

             CHLORIDE (BEAKER) (test 107 meq/L           H            



             code = 382)                                         

 

             CO2 (BEAKER) (test code = 23 meq/L     20-29                     



             355)                                                

 

             BLOOD UREA NITROGEN 6 mg/dL      10-26        L            



             (BEAKER) (test code = 354)                                        

 

             CREATININE (BEAKER) (test 0.66 mg/dL   0.50-1.20                 



             code = 358)                                         

 

             GLUCOSE RANDOM (BEAKER) 110 mg/dL                        



             (test code = 652)                                        

 

             CALCIUM (BEAKER) (test code 9.3 mg/dL    8.5-10.5                  



             = 697)                                              

 

             AST (SGOT) (BEAKER) (test 29 U/L       5-40                      



             code = 353)                                         

 

             ALT (SGPT) (BEAKER) (test 30 U/L       5-50                      



             code = 347)                                         

 

             EGFR (BEAKER) (test code = 95 mL/min/1.73 sq m                     

      ESTIMATED GFR IS NOT AS



             1092)                                               ACCURATE AS CRE

ATININE



                                                                 CLEARANCE IN OK

EDICTING



                                                                 GLOMERULAR FILT

RATION



                                                                 RATE. ESTIMATED

 GFR IS NOT



                                                                 APPLICABLE FOR 

DIALYSIS



                                                                 PATIENTS.



 ID - BRUCEURINALYSIS W/ [id number] 17:18:44





             Test Item    Value        Reference Range Interpretation Comments

 

             COLOR (BEAKER) (test code = Yellow                                 



             470)                                                

 

             CLARITY (BEAKER) (test code = Clear                                

  



             469)                                                

 

             SPECIFIC GRAVITY UA (BEAKER) 1.015        1.001-1.035              

 



             (test code = 468)                                        

 

             PH UA (BEAKER) (test code = 6.0          5.0-8.0                   



             467)                                                

 

             PROTEIN UA (BEAKER) (test code Negative     Negative               

   



             = 464)                                              

 

             GLUCOSE UA (BEAKER) (test code Negative     Negative               

   



             = 365)                                              

 

             KETONES UA (BEAKER) (test code Negative     Negative               

   



             = 371)                                              

 

             BILIRUBIN UA (BEAKER) (test Negative     Negative                  



             code = 462)                                         

 

             BLOOD UA (BEAKER) (test code = Negative     Negative               

   



             461)                                                

 

             NITRITE UA (BEAKER) (test code Negative     Negative               

   



             = 465)                                              

 

             LEUKOCYTE ESTERASE UA (BEAKER) Negative     Negative               

   



             (test code = 466)                                        

 

             UROBILINOGEN UA (BEAKER) (test 0.2 mg/dL    0.2-1.0                

   



             code = 463)                                         

 

             BACTERIA (BEAKER) (test code = Occasional                          

   



             517)                                                

 

             RBC UA-MANUAL (BEAKER) (test None Seen /HPF                        

   



             code = 1659)                                        

 

             WBC UA-MANUAL (BEAKER) (test <5 /HPF                               

 



             code = 1661)                                        

 

             SQUAMOUS EPITHELIAL MANUAL 5-10 /HPF                              



             (BEAKER) (test code = 1663)                                        

 

             SOURCE(BEAKER) (test code =                                        



             2795)                                               



[id number] 17:11:36





             Test Item    Value        Reference Range Interpretation Comments

 

             LIPASE (BEAKER) (test code = 749) 25 U/L       6-51                

      



 ID - BRUCEHCG, SERUM, [id number] 17:03:54





             Test Item    Value        Reference Range Interpretation Comments

 

             PREGNANCY TEST SERUM Negative                               \CGY412

/2023



             (BEAKER) (test code =                                        \



             584)                                                



CBC W/PLT COUNT &amp; AUTO [id number] 16:53:29





             Test Item    Value        Reference Range Interpretation Comments

 

             WHITE BLOOD CELL COUNT (BEAKER) 5.7 K/ L     4.0-10.0              

    



             (test code = 775)                                        

 

             RED BLOOD CELL COUNT (BEAKER) 4.48 M/ L    4.00-5.00               

  



             (test code = 761)                                        

 

             HEMOGLOBIN (BEAKER) (test code = 13.7 GM/DL   12.0-15.5            

     



             410)                                                

 

             HEMATOCRIT (BEAKER) (test code = 41.7 %       36.0-46.0            

     



             411)                                                

 

             MEAN CORPUSCULAR VOLUME (BEAKER) 93.1 fL      82.0-99.0            

     



             (test code = 753)                                        

 

             MEAN CORPUSCULAR HEMOGLOBIN 30.6 pg      27.0-33.0                 



             (BEAKER) (test code = 751)                                        

 

             MEAN CORPUSCULAR HEMOGLOBIN CONC 32.9 GM/DL   32.0-36.0            

     



             (BEAKER) (test code = 752)                                        

 

             RED CELL DISTRIBUTION WIDTH 13.7 %       12.0-15.0                 



             (BEAKER) (test code = 412)                                        

 

             PLATELET COUNT (BEAKER) (test 228 K/CU MM  150-430                 

  



             code = 756)                                         

 

             MEAN PLATELET VOLUME (BEAKER) 10.6 fL      6.0-11.5                

  



             (test code = 754)                                        

 

             NUCLEATED RED BLOOD CELLS 0 /100 WBC   0-0                       



             (BEAKER) (test code = 413)                                        

 

             NEUTROPHILS RELATIVE PERCENT 50 %                                  

 



             (BEAKER) (test code = 429)                                        

 

             LYMPHOCYTES RELATIVE PERCENT 32 %                                  

 



             (BEAKER) (test code = 430)                                        

 

             MONOCYTES RELATIVE PERCENT 14 %                                   



             (BEAKER) (test code = 431)                                        

 

             EOSINOPHILS RELATIVE PERCENT 3 %                                   

 



             (BEAKER) (test code = 432)                                        

 

             BASOPHILS RELATIVE PERCENT 0 %                                    



             (BEAKER) (test code = 437)                                        

 

             NEUTROPHILS ABSOLUTE COUNT 2.84 K/ L    1.80-8.00                 



             (BEAKER) (test code = 670)                                        

 

             LYMPHOCYTES ABSOLUTE COUNT 1.83 K/ L    1.48-4.50                 



             (BEAKER) (test code = 414)                                        

 

             MONOCYTES ABSOLUTE COUNT (BEAKER) 0.81 K/ L    0.00-1.30           

      



             (test code = 415)                                        

 

             EOSINOPHILS ABSOLUTE COUNT 0.15 K/ L    0.00-0.50                 



             (BEAKER) (test code = 416)                                        

 

             BASOPHILS ABSOLUTE COUNT (BEAKER) 0.01 K/ L    0.00-0.20           

      



             (test code = 417)                                        

 

             IMMATURE GRANULOCYTES-RELATIVE 1 %          0-0          H         

   



             PERCENT (BEAKER) (test code =                                      

  



             2801)

## 2023-02-20 NOTE — EKG
Test Date:    2023-02-17               Test Time:    16:07:09

Technician:   HB                                     

                                                     

MEASUREMENT RESULTS:                                       

Intervals:                                           

Rate:         58                                     

NE:           192                                    

QRSD:         98                                     

QT:           434                                    

QTc:          426                                    

Axis:                                                

P:            41                                     

NE:           192                                    

QRS:          34                                     

T:            41                                     

                                                     

INTERPRETIVE STATEMENTS:                                       

                                                     

Sinus bradycardia with sinus arrhythmia

Otherwise normal ECG

Compared to ECG 12/12/2019 00:20:48

Sinus rhythm no longer present



Electronically Signed On 02-20-23 12:38:20 CST by Ernie Castelan

## 2023-06-07 ENCOUNTER — HOSPITAL ENCOUNTER (EMERGENCY)
Dept: HOSPITAL 97 - ER | Age: 53
Discharge: HOME | End: 2023-06-07
Payer: COMMERCIAL

## 2023-06-07 VITALS — OXYGEN SATURATION: 97 % | DIASTOLIC BLOOD PRESSURE: 91 MMHG | TEMPERATURE: 97.6 F | SYSTOLIC BLOOD PRESSURE: 134 MMHG

## 2023-06-07 DIAGNOSIS — I10: ICD-10-CM

## 2023-06-07 DIAGNOSIS — H92.02: ICD-10-CM

## 2023-06-07 DIAGNOSIS — Z88.0: ICD-10-CM

## 2023-06-07 DIAGNOSIS — H60.8X2: Primary | ICD-10-CM

## 2023-06-07 PROCEDURE — 96372 THER/PROPH/DIAG INJ SC/IM: CPT

## 2023-06-07 PROCEDURE — 99284 EMERGENCY DEPT VISIT MOD MDM: CPT

## 2023-06-07 NOTE — ER
Nurse's Notes                                                                                     

 [facility]                                                                 

Name: Isabel Resendiz                                                                               

Age: 52 yrs                                                                                       

Sex: Female                                                                                       

: 1970                                                                                   

MRN: [id number]                                                                                   

Arrival Date: 2023                                                                          

Time: 01:20                                                                                       

Account#: [id number]                                                                            

Bed DX4                                                                                           

Private MD:                                                                                       

Diagnosis: Other otitis externa, left ear                                                         

                                                                                                  

Presentation:                                                                                     

                                                                                             

02:07 Chief complaint: Patient states: left ear pain since Thursday after chiropractor appt   kl  

      seen by PCP and has appt with ENT but is concerned with continuing pain has completed Z     

      veda and ha been using neomycin ear drop with no improvement. Coronavirus screen:            

      Vaccine status: Patient reports receiving the 2nd dose of the covid vaccine. Ebola          

      Screen: Patient negative for fever greater than or equal to 101.5 degrees Fahrenheit,       

      and additional compatible Ebola Virus Disease symptoms. Initial Sepsis Screen: Does the     

      patient meet any 2 criteria? No. Patient's initial sepsis screen is negative. Does the      

      patient have a suspected source of infection? No. Patient's initial sepsis screen is        

      negative. Risk Assessment: Do you want to hurt yourself or someone else? Patient            

      reports no desire to harm self or others.                                                   

02:07 Method Of Arrival: Ambulatory                                                           kl  

02:07 Acuity: ARNIE 4                                                                           kl  

                                                                                                  

Triage Assessment:                                                                                

02:11 General: Appears uncomfortable, Behavior is calm, cooperative. Pain: Complains of pain  kl  

      in left ear Pain currently is 8 out of 10 on a pain scale. EENT: Reports pain in left       

      ear.                                                                                        

                                                                                                  

Historical:                                                                                       

- Allergies:                                                                                      

02:10 PENICILLINS;                                                                            kl  

- PMHx:                                                                                           

02:10 Anxiety; Depression; Diverticulitis; Hypertension; palpitations; Sleep Apnea; SVT;      kl  

      Tachycardia;                                                                                

                                                                                                  

- Immunization history:: Adult Immunizations up to date.                                          

- Social history:: Smoking status: Patient denies any tobacco usage or history of.                

                                                                                                  

                                                                                                  

Screenin:00 [facility] ED Fall Risk Assessment (Adult) History of falling in the last 3 months,       aa9 

      including since admission No falls in past 3 months (0 pts) Confusion or Disorientation     

      No (0 pts) Intoxicated or Sedated No (0 pts) Impaired Gait No (0 pts) Mobility Assist       

      Device Used No (0 pt) Altered Elimination No (0 pt) Score/Fall Risk Level 0 - 2 = Low       

      Risk Oriented to surroundings, Maintained a safe environment, Educated pt \T\ family on     

      fall prevention, incl call for assistance when getting out of bed. Abuse screen: Denies     

      threats or abuse. Denies injuries from another. Nutritional screening: No deficits          

      noted. Tuberculosis screening: No symptoms or risk factors identified.                      

                                                                                                  

Assessment:                                                                                       

03:01 General: Appears uncomfortable, obese, well groomed, Behavior is calm, cooperative.     aa9 

      Pain: Complains of pain in left ear. Neuro: Level of Consciousness is awake, alert,         

      obeys commands, Oriented to person, place, time, situation. Cardiovascular: Patient's       

      skin is warm and dry. Respiratory: Airway is patent Respiratory effort is even,             

      unlabored.                                                                                  

                                                                                                  

Vital Signs:                                                                                      

02:07  / 91; Pulse 85; Resp 18; Temp 97.6; Pulse Ox 97% on R/A; Weight 117.48 kg (R);   kl  

      Height 5 ft. 3 in. ; Pain 8/10;                                                             

02:07 Body Mass Index 45.88 (117.48 kg, 160.02 cm)                                            kl  

02:07 Pain Scale: Adult                                                                         

                                                                                                  

ED Course:                                                                                        

01:23 Patient arrived in ED.                                                                  ja2 

02:10 Triage completed.                                                                         

02:14 Beni Ferguson MD is Attending Physician.                                           sp4 

03:01 Patient has correct armband on for positive identification. Bed in low position. Call   aa9 

      light in reach. Adult w/ patient. Pulse ox on. NIBP on.                                     

03:01 Arm band placed on.                                                                     aa9 

03:02 No provider procedures requiring assistance completed. Patient did not have IV access   aa9 

      during this emergency room visit.                                                           

                                                                                                  

Administered Medications:                                                                         

03:00 Drug: Rocephin (cefTRIAXone) IM 1 grams Route: IM; Site: right deltoid;                 aa9 

03:00 Follow up: Response: No adverse reaction                                                aa9 

03:00 Drug: traMADol  mg Route: PO;                                                     aa9 

03:00 Follow up: Response: No adverse reaction                                                aa9 

03:00 Drug: Acetaminophen PO 1000 mg Route: PO;                                               aa9 

03:00 Follow up: Response: No adverse reaction                                                aa9 

                                                                                                  

                                                                                                  

Medication:                                                                                       

03:01 VIS not applicable for this client.                                                     aa9 

                                                                                                  

Outcome:                                                                                          

02:28 Discharge ordered by MD.                                                                sp4 

03:02 Discharged to home ambulatory.                                                          aa9 

03:02 Condition: stable                                                                           

03:02 Discharge instructions given to patient, Instructed on discharge instructions, follow       

      up and referral plans. medication usage, Demonstrated understanding of instructions,        

      follow-up care, medications, Prescriptions given X 3.                                       

03:02 Patient left the ED.                                                                    aa9 

                                                                                                  

Signatures:                                                                                       

Feli Castillo RN RN kl Alexander, Jessica ja2 Avalos, Aylin, RN RN   aa9                                                  

Beni Ferguson MD MD   sp4                                                  

                                                                                                  

**************************************************************************************************

## 2023-06-07 NOTE — XMS REPORT
Continuity of Care Document

                             Created on:2023



Patient:BRENDAN LOYD

Sex:Female

:1970

External Reference #:[id number]





Demographics







                          Address                   [address]



                                                    [place] [address]

 

                          Home Phone                [phone]

 

                          Work Phone                [phone]

 

                          Mobile Phone              [phone]

 

                          Email Address             [email or web address]

 

                          Preferred Language        English

 

                          Marital Status            Unknown

 

                          Roman Catholic Affiliation     Unknown

 

                          Race                      Unknown

 

                          Additional Race(s)        Unavailable



                                                    White

 

                          Ethnic Group              Unknown









Author







                          Organization              [facility]

t

 

                          Address                   [address]



                                                    [place] [address]

 

                          Phone                     [phone]









Support







                Name            Relationship    Address         Phone

 

                DAISY VENTURA               Unavailable     [phone]

 

                ELIDA HOOPER               Unavailable     [phone]









Care Team Providers







                    Name                Role                Phone

 

                    Denice TRIPATHI, Raina Primary Care Physician [phone]

 

                    Elmira WOO, Carolina De La Fuente Attending Clinician [phone]

 

                    Trent TRIPATHI, Kenyatta Attending Clinician [phone]

 

                    PITA MERIDA   Attending Clinician Unavailable

 

                    LEYT PEACE  Attending Clinician Unavailable









Payers







           Payer Name Policy Type Policy Number Effective Date Expiration Date [facility]            [id number]   2021            



           RESOURCES CHOICE                       00:00:00              



           POS                                                    







Problems

This patient has no known problems.



Allergies, Adverse Reactions, Alerts







       Allergy Allergy Status Severity Reaction(s) Onset  Inactive Treating Comm

ents 

Source



       Name   Type                        Date   Date   Clinician        

 

       Penicill Propensi Active               2021                      [facility]



       in     [facility] to                                               [facility]



              adverse                      00:00:                      [facility]



              reaction                      00                          Center



              s                                                       

 

       PENICILL Allergy Active               2021                      [facility]



       IN                                                         [facility]



                                          00:00:                      [facility]



                                          00                          Center

 

       Penicill Propensi Active        Other (See                       [facility]

th[facility]



       ins    ty to                Comments) 3-29                        st



              adverse                      00:00:                      Hospita



              reaction                      00                          l



              s to                                                    



              drug                                                    







Family History







           Family Member Diagnosis  Comments   Start Date Stop Date  Source

 

           Natural father Anxiety disorder                                  [facility]

 

           Natural father Depression                                  [facility]

 

           Natural father Hypertension                                  [facility]

 

           Paternal grandmother Emphysema                                   [facility]







Social History







           Social Habit Start Date Stop Date  Quantity   Comments   Source

 

           Gender identity 2021            Identifies as            [facility]

[facility]



                      14:36:04              female gender            Hospital



                                            (finding)             

 

           Sexual orientation 2021            Heterosexual            [facility]

[facility]



                      14:36:04              (finding)             Hospital

 

           History SDOH                                             [facility]



           Alcohol Comment                                             [facility]

enter

 

           History SDOH                                             [facility]



           Alcohol Std Drinks                                             [facility]

[facility]

 

           History SDOH                                             [facility]



           Alcohol Binge                                             [facility]

ter

 

           History of tobacco                       Current smoker            [facility]

[facility]



           use                                                    Hospital

 

           Alcohol intake 2022 Lifetime              [facility]



                      00:00:00   00:00:00   non-drinker            Hospital



                                            (finding)             

 

           History of Social 2022                       [facility]

[facility]



           function   00:00:00   00:00:00                         Hospital

 

           History SDOH 2021 1                     [facility]



           Alcohol Frequency 00:00:00   00:00:00                         Medical

 Center

 

           Education  2021 16                    [facility]



                      00:00:00   00:00:00                         Hospital

 

           Tobacco use and 2021 Smokeless tobacco            [facility]

[facility]



           exposure   00:00:00   00:00:00   non-user              Hospital

 

           Sex Assigned At 1970                       [facility]

[facility]



           B[facility]      00:00[facility]00   [facility]00                         [facility]









                Smoking Status  Start Date      Stop Date       Source

 

                Never smoker                                    [facility]

 

                Ex-smoker       2021 00:00:00 2021 [facility]







Medications







       Ordered Filled Start  Stop   Current Ordering Indication Dosage Frequency

 Signature

                    Comments            Components          Source



     Medication Medication Date Date Medication? Clinician                (SIG) 

          



     Name Name                                                   

 

     propranoloL            Yes                      TAKE 1           [facility]

man[facility]



     (INDERAL)      1-03                               TABLET BY           st



     10 MG      00:00:                               MOUTH           Hospita



     tablet      00                                 THREE           l



                                                  TIMES A           



                                                  DAY            

 

     propranoloL            Yes                      TAKE 1           [facility]

man[facility]



     (INDERAL)      1-03                               TABLET BY           st



     10 MG      00:00:                               MOUTH           Hospita



     tablet      00                                 THREE           l



                                                  TIMES A           



                                                  DAY            

 

     olmesartan      2022      Yes                      TAKE 1           [facility]

di



     (BENICAR)      2-06                               TABLET BY           st



     40 MG      00:00:                               MOUTH           Hospita



     tablet      00                                 EVERY DAY           l

 

     olmesartan      2022      Yes                      TAKE 1           [facility]

di



     (BENICAR)      2-06                               TABLET BY           st



     40 MG      00:00:                               MOUTH           Hospita



     tablet      00                                 EVERY DAY           l

 

     montelukast      2022-0      Yes                                     [facility]

i



     (SINGULAIR)      1-17                                              st



     10 mg      14:07:                                              Hospita



     tablet      19                                                l

 

     dicyclomine      2-0      Yes                                     [facility]

i



     HCl (BENTYL      1-17                                              st



     IM)       14:07:                                              Hospita



               19                                                l

 

     clonAZEPAM      2-0      Yes                                     [facility]



     (KlonoPIN)      1-17                                              st



     0.5 MG      14:07:                                              Hospita



     disintegrat      19                                                l



     ing tablet                                                        

 

     ciprofloxac      2-0      Yes                                     [facility]

i



     in (Cipro)      1-17                                              st



     500 mg/5 mL      14:07:                                              Hospit

a



     suspension      19                                                l

 

     pantoprazol      2-0      Yes            40mg QD   Take 40 mg           

[facility]



     e         -17                               by mouth           st



     (PROTONIX)      14:07:                               daily.           Hospi

ta



     40 MG EC      19                                                l



     tablet                                                        

 

     metroNIDAZO      2-0      Yes            500mg Q.[id number] Take 500      

     [facility]



     LE (FLAGYL)      1-17                          [id number] mg by           s

t



     500 MG      14:07:                          3D   mouth 3           Hospita



     tablet      19                                 (three)           l



                                                  times a           



                                                  day.           

 

     montelukast      -0      Yes                                     [facility]

i



     (SINGULAIR)      1-17                                              st



     10 mg      14:07:                                              Hospita



     tablet      19                                                l

 

     dicyclomine      2-0      Yes                                     [facility]

i



     HCl (BENTYL      1-17                                              st



     IM)       14:07:                                              Hospita



               19                                                l

 

     clonAZEPAM      2-0      Yes                                     [facility]



     (KlonoPIN)      1-17                                              st



     0.5 MG      14:07:                                              [facility]ospita



     disintegrat      19                                                l



     ing tablet                                                        

 

     ciprofloxac      -0      Yes                                     [facility]

i



     in (Cipro)      1-17                                              st



     500 mg/5 mL      14:07:                                              Hospit

a



     suspension      19                                                l

 

     pantoprazol      2-0      Yes            40mg QD   Take 40 mg           

[facility]



     e         -17                               by mouth           st



     (PROTONIX)      14:07:                               daily.           Hospi

ta



     40 MG EC      19                                                l



     tablet                                                        

 

     metroNIDAZO      2-0      Yes            500mg Q.[id number] Take 500      

     [facility]



     LE (FLAGYL)      1-17                          [id number] mg by           s

t



     500 MG      14:07:                          3D   mouth 3           Hospita



     tablet      19                                 (three)           l



                                                  times a           



                                                  day.           

 

     propranoloL      2022-0 2023- No             10mg Q.[id number] Take 1        

   [facility]



     (INDERAL)      -17 -                     [id number] tablet (10        

   st



     10 MG      00:00: 00:00                     3D   mg total)           Hospit

a



     tablet      00   :00                           by mouth 3           l



                                                  (three)           



                                                  times a           



                                                  day.           

 

     propranoloL       No             10mg Q.[id number] Take 1        

   [facility]



     (INDERAL)                           [id number] tablet (10        

   st



     10 MG      00:00: 00:00                     3D   mg total)           Hospit

a



     tablet      00   :00                           by mouth 3           l



                                                  (three)           



                                                  times a           



                                                  day.           

 

     olmesartan       No             40mg QD   Take 1           [facility]

manoj



     (BENICAR)                                tablet (40           st



     40 MG      00:00: 00:00                          mg total)           Hospit

a



     tablet      00   :00                           by mouth           l



                                                  daily.           

 

     olmesartan       No             40mg QD   Take 1           [facility]

manoj



     (BENICAR)                                tablet (40           st



     40 MG      00:00: 00:00                          mg total)           Hospit

a



     tablet      00   :00                           by mouth           l



                                                  daily.           

 

     sertraline      2021      Yes            100mg QD   Take 100           [facility]



     (ZOLOFT)      1-02                               mg by           [facility]



     100 MG      16:19:                               mouth           Medical



     tablet      53                                 daily.           [place]

 

     olmesartan      2021      Yes            20mg QD   Take 20 mg           [facility]

[facility]



     (BENICAR)      02                               by mouth           [facility]



     20 MG      16:19:                               daily.           Medical



     tablet      53                                                [place]

 

     levocetiriz      2021      Yes            5mg  QD   Take 5 mg           [facility]

[facility]



     ine (XYZAL)      02                               by mouth           [facility]

s



     5 MG tablet      16:19:                               every           Medic

al



               53                                 evening.           [place]

 

     famotidine      2021      Yes            20mg Q.5D Take 20 mg           [facility]

[facility]



     (PEPCID) 20      -02                               by mouth 2           [facility]

[facility]



     MG tablet      16:19:                               (two)           Medical



               53                                 times           Center



                                                  daily.           

 

     propranoloL      2021      Yes            10mg Q.[id number] Take 10 mg     

      [facility]



     (INDERAL)      -                          [id number] by mouth 3         

  [facility]



     10 MG      16:19:                          3D   (three)           Medical



     tablet      53                                 times           Center



                                                  daily.           

 

     clonazePAM      2021      Yes            .5mg      Take 0.5           [facility]



     (KlonoPIN)      1-02                               mg by           [facility]



     0.5 MG      16:19:                               mouth 2           Medical



     tablet      53                                 (two)           Center



                                                  times           



                                                  daily as           



                                                  needed for           



                                                  Anxiety.           

 

     hydrOXYzine      2021      Yes            25mg      Take 25 mg           

[facility]



     (ATARAX) 25      -02                               by mouth 3           [facility]

[facility]



     MG tablet      16:19:                               (three)           Medic

al



               53                                 times           Center



                                                  daily as           



                                                  needed for           



                                                  Itching.           

 

     sertraline      2021      Yes            100mg QD   Take 100           [facility]



     (ZOLOFT)      1-02                               mg by           [facility]



     100 MG      16:19:                               mouth           Medical



     tablet      53                                 daily.           [place]

 

     olmesartan      2021      Yes            20mg QD   Take 20 mg           [facility]

[facility]



     (BENICAR)      02                               by mouth           [facility]



     20 MG      16:19:                               daily.           Medical



     tablet      53                                                Center

 

     levocetiriz      2021      Yes            5mg  QD   Take 5 mg           [facility]

[facility]



     ine (XYZAL)      02                               by mouth           [facility]

s



     5 MG tablet      16:19:                               every           Medic

al



               53                                 evening.           [place]

 

     famotidine      2021      Yes            20mg Q.5D Take 20 mg           [facility]

[facility]



     (PEPCID) 20      02                               by mouth 2           [facility]

[facility]



     MG tablet      16:19:                               (two)           Medical



               53                                 times           Center



                                                  daily.           

 

     propranoloL      2021      Yes            10mg Q.[id number] Take 10 mg     

      [facility]



     (INDERAL)                                [id number] by mouth 3         

  [facility]



     10 MG      16:19:                          3D   (three)           Medical



     tablet      53                                 times           Center



                                                  daily.           

 

     clonazePAM      2021      Yes            .5mg      Take 0.5           [facility]



     (KlonoPIN)      1-02                               mg by           [facility]



     0.5 MG      16:19:                               mouth 2           Medical



     tablet      53                                 (two)           Center



                                                  times           



                                                  daily as           



                                                  needed for           



                                                  Anxiety.           

 

     hydrOXYzine      2021      Yes            25mg      Take 25 mg           

[facility]



     (ATARAX) 25      02                               by mouth 3           [facility]

[facility]



     MG tablet      16:19:                               (three)           Medic

al



               53                                 times           Center



                                                  daily as           



                                                  needed for           



                                                  Itching.           







Vital Signs







             Vital Name   Observation Time Observation Value Comments     Source

 

             HEIGHT       2021 15:50:00 157.5 cm                  

 

             WEIGHT       2021 15:50:00 113.399 kg                

 

             HEIGHT       2021 15:50:00 157.5 cm                  

 

             WEIGHT       2021 15:50:00 113.399 kg                







Procedures

This patient has no known procedures.



Plan of Care







             Planned Activity Planned Date Details      Comments     Source

 

             Future Scheduled 2023   INFLUENZA VACCINE              [facility]



             Test         00:00:00     (Season Ended) [code =              [facility]

al Center



                                       INFLUENZA VACCINE              



                                       (Season Ended)]              

 

             Future Scheduled 2023   Hepatitis C screening              [facility]



             Test         16:45:07     (procedure) [code =              



                                       511683543]                

 

             Future Scheduled 2023   Screening for              [facility] 

[facility]



             Test         16:45:07     malignant neoplasm of              



                                       colon (procedure)              



                                       [code = 939854575]              

 

             Future Scheduled 2023   COVID-19 VACCINE (3 -              Me

[facility]



             Test         16:45:07     Booster for Moderna              



                                       series) [code =              



                                       COVID-19 VACCINE (3 -              



                                       Booster for Moderna              



                                       series)]                  

 

             Future Scheduled 2023   BREAST CANCER              [facility] 

[facility]



             Test         16:45:07     SCREENING [code =              



                                       BREAST CANCER              



                                       SCREENING]                

 

             Future Scheduled 2023   SHINGLES VACCINES (1              Met

[facility]



             Test         16:45:07     of 2) [code = SHINGLES              



                                       VACCINES (1 of 2)]              

 

             Future Scheduled 2023   INFLUENZA VACCINE              [facility]



             Test         16:45:07     [code = INFLUENZA              



                                       VACCINE]                  

 

             Future Scheduled 2023   Screening for              [facility]



             Test         16:45:07     malignant neoplasm of              



                                       cervix (procedure)              



                                       [code = 529707702]              

 

             Future Scheduled 2023   Hepatitis C screening              [facility]



             Test         14:12:59     (procedure) [code =              



                                       [id number]]                

 

             Future Scheduled 2023   COLONOSCOPY SCREENING              [facility]



             Test         14:12:59     [code = COLONOSCOPY              



                                       SCREENING]                

 

             Future Scheduled 2023   COVID-19 VACCINE (3 -              Me

[facility]



             Test         14:12:59     Booster for Moderna              



                                       series) [code =              



                                       COVID-19 VACCINE (3 -              



                                       Booster for Moderna              



                                       series)]                  

 

             Future Scheduled 2023   BREAST CANCER              [facility]



             Test         14:12:59     SCREENING [code =              



                                       BREAST CANCER              



                                       SCREENING]                

 

             Future Scheduled 2023   [facility] VACCINE              [facility]



             Test         14:12:59     [code = INFLUENZA              



                                       VACCINE]                  

 

             Future Scheduled 2023   SHINGLES VACCINES (1              [facility]

[facility]



             Test         14:12:59     of 2) [code = SHINGLES              



                                       VACCINES (1 of 2)]              

 

             Future Scheduled 2023   Screening for              [facility]



             Test         14:12:59     malignant neoplasm of              



                                       cervix (procedure)              



                                       [code = 254487464]              

 

             Future Scheduled 2023   DEPRESSION SCREENING              [facility]



             Test         00:00:00     (12+) [code =              [facility]



                                       DEPRESSION SCREENING              



                                       (12+)]                    

 

             Future Scheduled 2023   DEPRESSION SCREENING              [facility]



             Test         00:00:00     (12+) [code =              [facility] Center



                                       DEPRESSION SCREENING              



                                       (12+)]                    

 

             Future Scheduled 2022   INFLUENZA VACCINE (#1)              [facility]

[facility]



             Test         00:00:00     [code = INFLUENZA              Medical Ce

nter



                                       VACCINE (#1)]              

 

             Future Scheduled 2022   SHINGLES VACCINES (1              [facility]



             Test         00:00:00     of 2) [code = SHINGLES              [facility]

al Center



                                       VACCINES (1 of 2)]              

 

             Future Scheduled 2022   SHINGLES VACCINES (1              [facility]



             Test         00:00:00     of 2) [code = SHINGLES              Medic

al Center



                                       VACCINES (1 of 2)]              

 

             Future Scheduled 2017   Lipid panel               [facility]

s



             Test         00:00:00     (procedure) [code =              [facility]



                                       [id number]]                 

 

             Future Scheduled 2017   Lipid panel               [facility]

s



             Test         00:00:00     (procedure) [code =              [facility]



                                       70522397]                 

 

             Future Scheduled 1993   Screening for              [facility]

es



             Test         00:00:00     malignant neoplasm of              [facility]

l Center



                                       cervix (procedure)              



                                       [code = 567645681]              

 

             Future Scheduled 1993   Screening for              [facility]

es



             Test         00:00:00     malignant neoplasm of              [facility]

[facility] Center



                                       cervix (procedure)              



                                       [code = 902302127]              

 

             Future Scheduled 1991   DTAP/TDAP/TD VACCINES              [facility]



             Test         00:00:00     (1 - Tdap) [code =              Medical C

enter



                                       DTAP/TDAP/TD VACCINES              



                                       (1 - Tdap)]               

 

             Future Scheduled 1991   DTAP/TDAP/TD VACCINES              [facility]



             Test         00:00:00     (1 - Tdap) [code =              Medical C

enter



                                       DTAP/TDAP/TD VACCINES              



                                       (1 - Tdap)]               

 

             Future Scheduled 1990   HEPATITIS C SCREENING              [facility]



             Test         00:00:00     [code = HEPATITIS C              Medical 

Center



                                       SCREENING]                

 

             Future Scheduled 1990   HEPATITIS C SCREENING              [facility]



             Test         00:00:00     [code = HEPATITIS C              Medical 

Center



                                       SCREENING]                

 

             Future Scheduled 1984   Tobacco Cessation              [facility]



             Test         00:00:00     Counseling and              [facility] Cent[facility]

r



                                       Screening (12+) [code              



                                       = Tobacco Cessation              



                                       Counseling and              



                                       Screening (12+)]              

 

             Future Scheduled 1984   Tobacco Cessation              [facility]



             Test         00:00:00     Counseling and              [facility] Cent[facility]

r



                                       Screening (12+) [code              



                                       = Tobacco Cessation              



                                       Counseling and              



                                       Screening (12+)]              

 

             Future Scheduled 1973   COVID-19 VACCINE (#1)              [facility]



             Test         00:00:00     [code = COVID-19              Medical [facility]

ter



                                       VACCINE (#1)]              

 

             Future Scheduled 1973   COVID-19 VACCINE (#1)              [facility]



             Test         00:00:00     [code = COVID-19              [facility]

ter



                                       VACCINE (#1)]              

 

             Future Scheduled 1972   Screening for              [facility]

es



             Test         00:00:00     malignant neoplasm of              Medica

l Center



                                       breast (procedure)              



                                       [code = 199076924]              

 

             Future Scheduled 1972   CT [facility]              [facility]



             Test         00:00:00     (combo) [code = CT              Medical C

enter



                                       Colonography (combo)]              

 

             Future Scheduled 1972   Screening for              [facility]

es



             Test         00:00:00     malignant neoplasm of              Medica

l Center



                                       colon (procedure)              



                                       [code = 486197446]              

 

             Future Scheduled 1972   Screening for              [facility]

es



             Test         00:00:00     malignant neoplasm of              Med[facility]

l Center



                                       colon (procedure)              



                                       [code = 672324190]              

 

             Future Scheduled 1972   Screening for              [facility]

es



             Test         00:00:00     malignant neoplasm of              Medica

l Center



                                       colon (procedure)              



                                       [code = 135510166]              

 

             Future Scheduled 1972   Screening for              [facility]

es



             Test         00:00:00     malignant neoplasm of              [facility]

l Center



                                       colon (procedure)              



                                       [code = 607898079]              

 

             Future Scheduled 1972   Sigmoidoscopy [code =              [facility]



             Test         00:00:00     Sigmoidoscopy]              [facility] Cent[facility]

r

 

             Future Scheduled 1972   CT [facility]



             Test         00:00:00     (combo) [code = CT              Medical C

enter



                                       Colonography (combo)]              

 

             Future Scheduled 1972   Screening for              [facility]

es



             Test         00:00:00     malignant neoplasm of              Medica

l Center



                                       colon (procedure)              



                                       [code = 757316533]              

 

             Future Scheduled 1972   Screening for              [facility]

es



             Test         00:00:00     malignant neoplasm of              [facility]

l Center



                                       colon (procedure)              



                                       [code = 778720684]              

 

             Future Scheduled 1972   Screening for              [facility]

es



             Test         00:00:00     malignant neoplasm of              [facility]

l Center



                                       colon (procedure)              



                                       [code = 361050505]              

 

             Future Scheduled 1972   Screening for              [facility]

es



             Test         00:00:00     malignant neoplasm of              [facility]

l Center



                                       colon (procedure)              



                                       [code = 926401263]              

 

             Future Scheduled 1972   Sigmoidoscopy [code =              [facility]



             Test         00:00:00     Sigmoidoscopy]              Medical Cent[facility]

r

 

             Future Scheduled 1972   Screening for              [facility]

es



             Test         00:00:00     malignant neoplasm of              Medica

l Center



                                       breast (procedure)              



                                       [code = 620784371]              







Encounters







        Start   End     Encounter Admission Attending Care    Care    Encounter 

Source



        Date/Time Date/Time Type    Type    Clinicians Facility Department ID   

   

 

        2022 Refill          Ku, 1.2.840.1 [id number]

24822 [facility]



        00:00:00 00:00:00                 [place]ina [place] Fuente 77243.1.1         [address]



                                                3.430.2.7                 Hospit

a



                                                .[id number]                 l



                                                .8                      

 

        2022 Refill          Ku, 1.2.840.1 [id number]

52600 [facility]



        00:00:00 00:00:00                 [facility]ina [place] La Fuente 03693.1.1         [address]



                                                3.430.2.7                 Hospit

a



                                                .3.825546                 l



                                                .8                      

 

        2022 Refill          [place], 1.2.840.1 [id number] [id number]

101630 [facility]



        00:00:00 00:00:00                 Kenyatta  96761.1.1         [address]     [address]



                                                3.430.2.7                 Hospit

a



                                                .3.518767                 l



                                                .8                      

 

        2022 Refill          [place], 1.2.840.1 [id number] 

[id number] [facility]



        00:00:00 00:00:00                 Kenyatta  84105.1.1         [address]     [address]



                                                3.430.2.7                 Hospit

a



                                                .3.012232                 l



                                                .8                      

 

        2022 Outpatient         KU, [facility]     [id number]

[address] [place]



        00:00:00 00:00:00                 CAROLINA                   127     Method

i



                                                                        st

 

        2021-12-15 2021-12-15 Outpatient         TRENT, [facility]     [id number]

[id number] [place]



        00:00:00 00:00:00                 KENYATTA                  428     Method

i



                                                                        st

 

        2021 Outpatient         FLORINAAPULI, [facility]     17115

[address] [place]



        00:00:00 00:00:00                 KENYATTA                  339     Method

i



                                                                        st

 

        2021 Outpatient         DESPLINTER, [facility]     210

[id number] [place]



        00:00:00 00:00:00                 PITA                   896     [facility]

i



                                                                        st

 

        2021 Emergency ER      BOSS,   [facility]    Emergency [id number]

[id number] [facility]



        15:53:00 20:11:00                 LETY                          

 

        2021-10-05 2021-10-05 Outpatient         TRENT, [facility]     [id number]

[id number] [place]



        00:00:00 00:00:00                 KENYATTA                  352     Method

i



                                                                        

 

        2021 Emergency                 [facility]     Ange     [id number]

[id number] [place]



        00:00:00 00:00:00                                         621     Method

i



                                                                        st

 

        2021 Outpatient         DESPLINTER, [facility]     210

[id number] [place]



        00:00:00 00:00:00                 PITA                   585     [facility]

i



                                                                        st

 

        2021 Outpatient         DESPLINTER, [facility]     210

[id number] [place]



        00:00:00 00:00:00                 PITA                   584     Method

i



                                                                        st

 

        2021 Outpatient         DESPLINTER, [facility]     210

[id number] [place]



        00:00:00 00:00:00                 IPTA                   405     Method

i



                                                                        st







Results







           Test Description Test Time  Test Comments Results    Result     [facility]

e



                                                       Comments   

 

           CT, ABDOMEN 2021 Reason for ************************           

 



                      19:17:00   exam:->diffuse ************************        

    



                                 abd pain,  ************[facility]            



                                 diarrhea   - [facility]            



                                            BRENDAN LOYD :            



                                            1970 Sex:            



                                            F***********************            



                                            ************************            



                                            *************FINAL            



                                            REPORT PATIENT ID:            



                                            [id number] CT, ABDOMEN \T\            



                                            PELVIS, WITH IV CONTRAST            



                                            HISTORY: Abdominal pain,            



                                            acute,                



                                            nonlocalizeddiffuse abd            



                                            pain, diarrhea            



                                            COMPARISON: None            



                                            TECHNIQUE: CT of the            



                                            abdomen and pelvis WITH            



                                            intravenous contrast and            



                                            WITHOUT oral contrast.            



                                            The examination was            



                                            performed according to            



                                            the departmental            



                                            dose-optimization            



                                            program, which includes            



                                            automated exposure            



                                            control, adjustment of            



                                            the mA and/or kV            



                                            according to patient            



                                            size and/or use of            



                                            iterative reconstruction            



                                            technique.  FINDINGS:            



                                            Lower thorax: Trace            



                                            bilateral pleural            



                                            effusions.Liver: Mildly            



                                            decreased in attenuation            



                                            diffusely. No solid            



                                            mass.Gallbladder and            



                                            bile ducts:            



                                            Unremarkable.Spleen:            



                                            Markedly              



                                            enlarged.Pancreas:            



                                            Unremarkable.Adrenals:            



                                            UnremarkableKidneys and            



                                            ureters:              



                                            Unremarkable.Bowel:            



                                            Normal appendix.            



                                            Nondilated bowel with no            



                                            wall thickening. Colonic            



                                            diverticulosis without            



                                            associated colonic wall            



                                            thickening or            



                                            surrounding            



                                            stranding.Bladder:            



                                            Unremarkable.Reproductiv            



                                            e organs: A unilocular            



                                            simple appearing 6.5 cm            



                                            right adnexal cyst. A            



                                            solid appearing 3.6 cm            



                                            lesion in the left            



                                            adnexa is most likely a            



                                            subserosal fibroid is            



                                            contiguous with the left            



                                            half of the uterus.Lymph            



                                            nodes:                



                                            Unremarkable.Peritoneum:            



                                            Unremarkable.Vessels:            



                                            Mild atherosclerotic            



                                            calcifications.Abdominal            



                                            wall: Small            



                                            fat-containing right            



                                            greater than left            



                                            inguinal hernia.Bones:            



                                            Mild degenerative            



                                            changes of the lumbar            



                                            spine worst at L5-S1.            



                                            IMPRESSION: 1.No acute            



                                            findings in the abdomen            



                                            or pelvis. 2.Colonic            



                                            diverticulosis.            



                                            3.Splenomegaly and            



                                            suggestion of hepatic            



                                            steatosis. 4.A            



                                            unilocular simple            



                                            appearing 6.5 cm right            



                                            adnexal cyst. Recommend            



                                            ultrasound follow-up in            



                                            3-6 months. 5.A solid            



                                            appearing 3.6 cm lesion            



                                            in the left adnexa is            



                                            most likely a subserosal            



                                            fibroid. Pelvic            



                                            ultrasound could be            



                                            performed today to            



                                            confirm this. Signed:            



                                            Robyn, Alfie MDReport            



                                            Verified Date/Time:            



                                            2021 19:17:10            



                                            Reading Location: [address] Transitional            



                                            Reading Room            



                                            Electronically signed            



                                            by: ALFIE CAM MD on 2021 07:17            



                                            PM                    









                    COMPREHENSIVE METABOLIC PANEL 2021 17:21:46 









                      Test Item  Value      Reference Range Interpretation Comme

nts









             TOTAL PROTEIN (BEAKER) 7.3 gm/dL    6.0-8.5                   



             (test code = 770)                                        

 

             ALBUMIN (BEAKER) (test code 4.2 g/dL     3.5-5.0                   



             = 1145)                                             

 

             ALKALINE PHOSPHATASE 47 U/L                           



             (BEAKER) (test code = 346)                                        

 

             BILIRUBIN TOTAL (BEAKER) 0.5 mg/dL    0.1-1.2                   



             (test code = 377)                                        

 

             SODIUM (BEAKER) (test code 141 meq/L    135-148                   



             = 381)                                              

 

             POTASSIUM (BEAKER) (test 3.7 meq/L    3.6-5.5                   



             code = 379)                                         

 

             CHLORIDE (BEAKER) (test 107 meq/L           H            



             code = 382)                                         

 

             CO2 (BEAKER) (test code = 23 meq/L     20-29                     



             355)                                                

 

             BLOOD UREA NITROGEN 6 mg/dL      10-26        L            



             (BEAKER) (test code = 354)                                        

 

             CREATININE (BEAKER) (test 0.66 mg/dL   0.50-1.20                 



             code = 358)                                         

 

             GLUCOSE RANDOM (BEAKER) 110 mg/dL                        



             (test code = 652)                                        

 

             CALCIUM (BEAKER) (test code 9.3 mg/dL    8.5-10.5                  



             = 697)                                              

 

             AST (SGOT) (BEAKER) (test 29 U/L       5-40                      



             code = 353)                                         

 

             ALT (SGPT) (BEAKER) (test 30 U/L       5-50                      



             code = 347)                                         

 

             EGFR (BEAKER) (test code = 95 mL/min/1.73 sq m                     

      ESTIMATED GFR IS NOT AS



             1092)                                               ACCURATE AS CRE

ATININE



                                                                 CLEARANCE IN [place]

EDICTING



                                                                 GLOMERULAR FILT

RATION



                                                                 RATE. ESTIMATED

 GFR IS NOT



                                                                 APPLICABLE FOR 

DIALYSIS



                                                                 PATIENTS.



 ID - BRUCEURINALYSIS W/ [id number] 17:18:44





             Test Item    Value        Reference Range Interpretation Comments

 

             COLOR (BEAKER) (test code = Yellow                                 



             470)                                                

 

             CLARITY (BEAKER) (test code = Clear                                

  



             469)                                                

 

             SPECIFIC GRAVITY UA (BEAKER) 1.015        1.001-1.035              

 



             (test code = 468)                                        

 

             PH UA (BEAKER) (test code = 6.0          5.0-8.0                   



             467)                                                

 

             PROTEIN UA (BEAKER) (test code Negative     Negative               

   



             = 464)                                              

 

             GLUCOSE UA (BEAKER) (test code Negative     Negative               

   



             = 365)                                              

 

             KETONES UA (BEAKER) (test code Negative     Negative               

   



             = 371)                                              

 

             BILIRUBIN UA (BEAKER) (test Negative     Negative                  



             code = 462)                                         

 

             BLOOD UA (BEAKER) (test code = Negative     Negative               

   



             461)                                                

 

             NITRITE UA (BEAKER) (test code Negative     Negative               

   



             = 465)                                              

 

             LEUKOCYTE ESTERASE UA (BEAKER) Negative     Negative               

   



             (test code = 466)                                        

 

             UROBILINOGEN UA (BEAKER) (test 0.2 mg/dL    0.2-1.0                

   



             code = 463)                                         

 

             BACTERIA (BEAKER) (test code = Occasional                          

   



             517)                                                

 

             RBC UA-MANUAL (BEAKER) (test None Seen /HPF                        

   



             code = 1659)                                        

 

             WBC UA-MANUAL (BEAKER) (test <5 /HPF                               

 



             code = 1661)                                        

 

             SQUAMOUS EPITHELIAL MANUAL 5-10 /HPF                              



             (BEAKER) (test code = 1663)                                        

 

             SOURCE(BEAKER) (test code =                                        



             2795)                                               



[id number] 17:11:36





             Test Item    Value        Reference Range Interpretation Comments

 

             LIPASE (BEAKER) (test code = 749) 25 U/L       6-51                

      



 ID - BRUCEHCG, SERUM, [id number] 17:03:54





             Test Item    Value        Reference Range Interpretation Comments

 

             PREGNANCY TEST SERUM Negative                               \HIC190





             (BEAKER) (test code =                                        \



             584)                                                



CBC W/PLT COUNT &amp; AUTO [id number] 16:53:29





             Test Item    Value        Reference Range Interpretation Comments

 

             WHITE BLOOD CELL COUNT (BEAKER) 5.7 K/ L     4.0-10.0              

    



             (test code = 775)                                        

 

             RED BLOOD CELL COUNT (BEAKER) 4.48 M/ L    4.00-5.00               

  



             (test code = 761)                                        

 

             HEMOGLOBIN (BEAKER) (test code = 13.7 GM/DL   12.0-15.5            

     



             410)                                                

 

             HEMATOCRIT (BEAKER) (test code = 41.7 %       36.0-46.0            

     



             411)                                                

 

             MEAN CORPUSCULAR VOLUME (BEAKER) 93.1 fL      82.0-99.0            

     



             (test code = 753)                                        

 

             MEAN CORPUSCULAR HEMOGLOBIN 30.6 pg      27.0-33.0                 



             (BEAKER) (test code = 751)                                        

 

             MEAN CORPUSCULAR HEMOGLOBIN CONC 32.9 GM/DL   32.0-36.0            

     



             (BEAKER) (test code = 752)                                        

 

             RED CELL DISTRIBUTION WIDTH 13.7 %       12.0-15.0                 



             (BEAKER) (test code = 412)                                        

 

             PLATELET COUNT (BEAKER) (test 228 K/CU MM  150-430                 

  



             code = 756)                                         

 

             MEAN PLATELET VOLUME (BEAKER) 10.6 fL      6.0-11.5                

  



             (test code = 754)                                        

 

             NUCLEATED RED BLOOD CELLS 0 /100 WBC   0-0                       



             (BEAKER) (test code = 413)                                        

 

             NEUTROPHILS RELATIVE PERCENT 50 %                                  

 



             (BEAKER) (test code = 429)                                        

 

             LYMPHOCYTES RELATIVE PERCENT 32 %                                  

 



             (BEAKER) (test code = 430)                                        

 

             MONOCYTES RELATIVE PERCENT 14 %                                   



             (BEAKER) (test code = 431)                                        

 

             EOSINOPHILS RELATIVE PERCENT 3 %                                   

 



             (BEAKER) (test code = 432)                                        

 

             BASOPHILS RELATIVE PERCENT 0 %                                    



             (BEAKER) (test code = 437)                                        

 

             NEUTROPHILS ABSOLUTE COUNT 2.84 K/ L    1.80-8.00                 



             (BEAKER) (test code = 670)                                        

 

             LYMPHOCYTES ABSOLUTE COUNT 1.83 K/ L    1.48-4.50                 



             (BEAKER) (test code = 414)                                        

 

             MONOCYTES ABSOLUTE COUNT (BEAKER) 0.81 K/ L    0.00-1.30           

      



             (test code = 415)                                        

 

             EOSINOPHILS ABSOLUTE COUNT 0.15 K/ L    0.00-0.50                 



             (BEAKER) (test code = 416)                                        

 

             BASOPHILS ABSOLUTE COUNT (BEAKER) 0.01 K/ L    0.00-0.20           

      



             (test code = 417)                                        

 

             IMMATURE GRANULOCYTES-RELATIVE 1 %          0-0          H         

   



             PERCENT (BEAKER) (test code =                                      

  



             8895)

## 2023-06-07 NOTE — EDPHYS
Physician Documentation                                                                           

 [facility]                                                                 

Name: Isabel Resendiz                                                                               

Age: 52 yrs                                                                                       

Sex: Female                                                                                       

: 1970                                                                                   

MRN: [id number]                                                                                   

Arrival Date: 2023                                                                          

Time: 01:20                                                                                       

Account#: [id number]                                                                            

Bed DX4                                                                                           

Private MD:                                                                                       

ED Physician Beni Ferguson                                                                    

HPI:                                                                                              

                                                                                             

02:14 This 52 yrs old  Female presents to ER via Ambulatory with complaints of Ear   sp4 

      Pain.                                                                                       

02:20 52-year-old female presents with 6 days of left ear pain, patient was diagnosed with    sp4 

      otitis prescribe Zithromax also given antibiotic eardrops that are containing neomycin.     

      Patient states that this is not helping her..                                               

                                                                                                  

Historical:                                                                                       

- Allergies:                                                                                      

02:10 PENICILLINS;                                                                            kl  

- PMHx:                                                                                           

02:10 Anxiety; Depression; Diverticulitis; Hypertension; palpitations; Sleep Apnea; SVT;      kl  

      Tachycardia;                                                                                

                                                                                                  

- Immunization history:: Adult Immunizations up to date.                                          

- Social history:: Smoking status: Patient denies any tobacco usage or history of.                

                                                                                                  

                                                                                                  

ROS:                                                                                              

02:22 Constitutional: Negative for fever, chills, and weight loss, Eyes: Negative for injury, sp4 

      pain, redness, and discharge, ENT: Negative for injury,  and discharge, positive for        

      left ear pain, Neck: Negative for injury, pain, and swelling, Cardiovascular: Negative      

      for chest pain, palpitations, and edema, Respiratory: Negative for shortness of breath,     

      cough, wheezing, and pleuritic chest pain, Abdomen/GI: Negative for abdominal pain,         

      nausea, vomiting, diarrhea, and constipation, Back: Negative for injury and pain, :       

      Negative for injury, bleeding, discharge, and swelling, MS/Extremity: Negative for          

      injury and deformity, Skin: Negative for injury, rash, and discoloration, Neuro:            

      Negative for headache, weakness, numbness, tingling, and seizure, Psych: Negative for       

      depression, anxiety,  Allergy/Immunology: Negative for hives, rash, and allergies           

      Endocrine: Negative for neck swelling, polydipsia, polyuria, polyphagia, and weight         

      changes Hematologic/Lymphatic: Negative for swollen nodes, abnormal bleeding, and           

      unusual bruising                                                                            

                                                                                                  

Exam:                                                                                             

02:22 Constitutional:  This is a well developed, well nourished patient who is awake, alert,  sp4 

      and in no acute distress. Head/Face:  Normocephalic, atraumatic. Eyes:  Pupils equal        

      round and reactive to light, extra-ocular motions intact.  Lids and lashes normal.          

      Conjunctiva and sclera are not injected.  Cornea within normal limits.  Periorbital         

      areas with no swelling, redness, or edema. ENT:  Nares patent. No nasal discharge, no       

      septal abnormalities noted.   Oropharynx with no redness, swelling, or masses,              

      exudates, or evidence of obstruction, uvula midline.  Mucous membranes moist.               

      Positive for left ear canal swelling, redness, purulent discharge consistent with left      

      otitis externa.  Right ear exam is unremarkable.   Neck:  Trachea midline, no               

      thyromegaly or masses palpated, and no cervical lymphadenopathy.  Supple, full range of     

      motion without nuchal rigidity, or vertebral point tenderness.  No Meningismus.             

      Chest/axilla:  Normal chest wall appearance and motion.  Nontender with no deformity.       

      No lesions are appreciated. Cardiovascular:  Regular rate and rhythm with a normal S1       

      and S2.  No gallops, murmurs, or rubs.  Normal PMI, no JVD.  No pulse deficits.             

      Respiratory:  Lungs have equal breath sounds bilaterally, clear to auscultation and         

      percussion.  No rales, rhonchi or wheezes noted.  No increased work of breathing, no        

      retractions or nasal flaring. Abdomen/GI:  Soft, non-tender, with normal bowel sounds.      

      No distension or tympany.  No guarding or rebound.  No evidence of tenderness               

      throughout. Back:  No spinal tenderness.  No costovertebral tenderness.  Skin:  Warm,       

      dry with normal turgor.  Normal color with no rashes, no lesions, and no evidence of        

      cellulitis. MS/ Extremity:  Pulses equal, no cyanosis.  Neurovascular intact.  Full,        

      normal range of motion. Neuro:  Awake and alert, GCS 15, oriented to person, place,         

      time, and situation.  Cranial nerves II-XII grossly intact.  Motor strength 5/5 in all      

      extremities.  Sensory grossly intact.  Psych:  Awake, alert, with orientation to            

      person, place and time.  Behavior, mood, and affect are within normal limits                

                                                                                                  

Vital Signs:                                                                                      

02:07  / 91; Pulse 85; Resp 18; Temp 97.6; Pulse Ox 97% on R/A; Weight 117.48 kg (R);   kl  

      Height 5 ft. 3 in. ; Pain 8/10;                                                             

02:07 Body Mass Index 45.88 (117.48 kg, 160.02 cm)                                            kl  

02:07 Pain Scale: Adult                                                                       kl  

                                                                                                  

MDM:                                                                                              

02:22 Differential diagnosis: otitis media, otitis externa, ruptured TM, foreign body, acute  sp4 

      otalgia. Data reviewed: vital signs.                                                        

02:25 Patient medically screened.                                                             sp4 

02:25 Data reviewed: nurses notes, old medical records. ED course: We will provide cefdinir   sp4 

      twice a day for 10 days and Ciprodex..                                                      

                                                                                                  

Administered Medications:                                                                         

03:00 Drug: Rocephin (cefTRIAXone) IM 1 grams Route: IM; Site: right deltoid;                 aa9 

03:00 Follow up: Response: No adverse reaction                                                aa9 

03:00 Drug: traMADol  mg Route: PO;                                                     aa9 

03:00 Follow up: Response: No adverse reaction                                                aa9 

03:00 Drug: Acetaminophen PO 1000 mg Route: PO;                                               aa9 

03:00 Follow up: Response: No adverse reaction                                                aa9 

                                                                                                  

                                                                                                  

Disposition Summary:                                                                              

23 02:28                                                                                    

Discharge Ordered                                                                                 

      Location: Home                                                                          sp4 

      Problem: new                                                                            sp4 

      Symptoms: have improved                                                                 sp4 

      Condition: Stable                                                                       sp4 

      Diagnosis                                                                                   

        - Other otitis externa, left ear                                                      sp4 

      Followup:                                                                               sp4 

        - With: Private Physician                                                                  

        - When: 7 - 10 days                                                                        

        - Reason: Recheck today's complaints                                                       

      Discharge Instructions:                                                                     

        - Discharge Summary Sheet                                                             sp4 

        - Otitis Externa, Easy-to-Read                                                        sp4 

      Prescriptions:                                                                              

        - Cephalexin 500 mg Oral Capsule                                                           

            - take 1 capsule by ORAL route every 8 hours for 10 days; 30 capsule; Refills: 0, sp4 

      Product Selection Permitted                                                                 

        - Tramadol 50 mg Oral Tablet                                                               

            - take 1 tablet by ORAL route every 8 hours as needed; 30 tablet; Refills: 0,     sp4 

      Product Selection Permitted                                                                 

        - Ciprodex 0.3-0.1 % Otic drops,suspension                                                 

            - instill 4 drops by OTIC route every 12 hours for 7 days Left ear canal only; 7  sp4 

      milliliter; Refills: 0, Product Selection Permitted                                         

Signatures:                                                                                       

Feli Castillo RN                     Opal Garza RN RN aa9 Potepalov, Sergey, MD MD   sp4                                                  

                                                                                                  

Corrections: (The following items were deleted from the chart)                                    

02:21 02:20 52-year-old female presents with 3 days of left ear pain. sp4                     sp4 

                                                                                                  

**************************************************************************************************

## 2025-04-13 ENCOUNTER — HOSPITAL ENCOUNTER (EMERGENCY)
Dept: HOSPITAL 97 - ER | Age: 55
LOS: 1 days | Discharge: HOME | End: 2025-04-14
Payer: COMMERCIAL

## 2025-04-13 DIAGNOSIS — K21.9: Primary | ICD-10-CM

## 2025-04-13 PROCEDURE — 96374 THER/PROPH/DIAG INJ IV PUSH: CPT

## 2025-04-13 PROCEDURE — 83690 ASSAY OF LIPASE: CPT

## 2025-04-13 PROCEDURE — 81003 URINALYSIS AUTO W/O SCOPE: CPT

## 2025-04-13 PROCEDURE — 99284 EMERGENCY DEPT VISIT MOD MDM: CPT

## 2025-04-13 PROCEDURE — 36415 COLL VENOUS BLD VENIPUNCTURE: CPT

## 2025-04-13 PROCEDURE — 80053 COMPREHEN METABOLIC PANEL: CPT

## 2025-04-13 PROCEDURE — 85025 COMPLETE CBC W/AUTO DIFF WBC: CPT

## 2025-04-14 VITALS — OXYGEN SATURATION: 99 % | SYSTOLIC BLOOD PRESSURE: 132 MMHG | DIASTOLIC BLOOD PRESSURE: 75 MMHG | TEMPERATURE: 97.9 F

## 2025-04-14 LAB
ALBUMIN SERPL BCP-MCNC: 3.7 G/DL (ref 3.4–5)
ALBUMIN/GLOB SERPL: 1.1 {RATIO} (ref 1.1–1.8)
ANION GAP SERPL CALC-SCNC: 10.6 MEQ/L (ref 5–15)
GLOBULIN SER CALC-MCNC: 3.5 G/DL (ref 2.3–3.5)
HCT VFR BLD CALC: 38.3 % (ref 36–45)
LYMPHOCYTES # SPEC AUTO: 4.4 K/UL (ref 0.7–4.9)
MCH RBC QN AUTO: 31.2 PG (ref 27–35)
MCHC RBC AUTO-ENTMCNC: 34.1 G/DL (ref 32–36)
MCV RBC: 91.6 FL (ref 80–100)
NRBC BLD AUTO-RTO: 0.3 % (ref 0–0)
PMV BLD: 9.2 FL (ref 7.6–11.3)
POTASSIUM SERPL-SCNC: 3.6 MEQ/L (ref 3.5–5.1)
RBC # BLD: 4.18 M/UL (ref 3.86–4.86)
UA DIPSTICK W REFLEX MICRO PNL UR: (no result)